# Patient Record
Sex: MALE | Race: WHITE | HISPANIC OR LATINO | Employment: UNEMPLOYED | URBAN - METROPOLITAN AREA
[De-identification: names, ages, dates, MRNs, and addresses within clinical notes are randomized per-mention and may not be internally consistent; named-entity substitution may affect disease eponyms.]

---

## 2023-03-31 DIAGNOSIS — M25.561 PAIN IN BOTH KNEES, UNSPECIFIED CHRONICITY: Primary | ICD-10-CM

## 2023-03-31 DIAGNOSIS — M25.562 PAIN IN BOTH KNEES, UNSPECIFIED CHRONICITY: Primary | ICD-10-CM

## 2023-04-03 ENCOUNTER — OFFICE VISIT (OUTPATIENT)
Dept: ORTHOPEDICS | Facility: CLINIC | Age: 38
End: 2023-04-03
Payer: MEDICAID

## 2023-04-03 VITALS
WEIGHT: 200.81 LBS | BODY MASS INDEX: 29.74 KG/M2 | SYSTOLIC BLOOD PRESSURE: 131 MMHG | HEART RATE: 96 BPM | DIASTOLIC BLOOD PRESSURE: 76 MMHG | HEIGHT: 69 IN

## 2023-04-03 DIAGNOSIS — M23.203 OLD BUCKET HANDLE TEAR OF MEDIAL MENISCUS OF RIGHT KNEE: ICD-10-CM

## 2023-04-03 PROCEDURE — 99999 PR PBB SHADOW E&M-EST. PATIENT-LVL III: ICD-10-PCS | Mod: PBBFAC,,,

## 2023-04-03 PROCEDURE — 99204 PR OFFICE/OUTPT VISIT, NEW, LEVL IV, 45-59 MIN: ICD-10-PCS | Mod: S$PBB,,,

## 2023-04-03 PROCEDURE — 3078F PR MOST RECENT DIASTOLIC BLOOD PRESSURE < 80 MM HG: ICD-10-PCS | Mod: CPTII,,,

## 2023-04-03 PROCEDURE — 3075F PR MOST RECENT SYSTOLIC BLOOD PRESS GE 130-139MM HG: ICD-10-PCS | Mod: CPTII,,,

## 2023-04-03 PROCEDURE — 3008F PR BODY MASS INDEX (BMI) DOCUMENTED: ICD-10-PCS | Mod: CPTII,,,

## 2023-04-03 PROCEDURE — 3078F DIAST BP <80 MM HG: CPT | Mod: CPTII,,,

## 2023-04-03 PROCEDURE — 99999 PR PBB SHADOW E&M-EST. PATIENT-LVL III: CPT | Mod: PBBFAC,,,

## 2023-04-03 PROCEDURE — 3008F BODY MASS INDEX DOCD: CPT | Mod: CPTII,,,

## 2023-04-03 PROCEDURE — 1159F PR MEDICATION LIST DOCUMENTED IN MEDICAL RECORD: ICD-10-PCS | Mod: CPTII,,,

## 2023-04-03 PROCEDURE — 3075F SYST BP GE 130 - 139MM HG: CPT | Mod: CPTII,,,

## 2023-04-03 PROCEDURE — 1159F MED LIST DOCD IN RCRD: CPT | Mod: CPTII,,,

## 2023-04-03 PROCEDURE — 99204 OFFICE O/P NEW MOD 45 MIN: CPT | Mod: S$PBB,,,

## 2023-04-03 PROCEDURE — 99213 OFFICE O/P EST LOW 20 MIN: CPT | Mod: PBBFAC,PN

## 2023-04-03 NOTE — LETTER
April 3, 2023      [facility]  [address], [facility] [address]  [facility] [address]  Phone: [phone]  Fax: [phone]       Patient: Lalo Tran   YOB: 1985  Date of Visit: 04/03/2023    To Whom It May Concern:    Ysabel Tran  was at [facility] on 04/03/2023. He may return to work on 04/03/2023 With/no restrictions. If you have any questions or concerns, or if I can be of further assistance, please do not hesitate to contact me.    Sincerely,    Mague Fischer MA

## 2023-04-03 NOTE — PROGRESS NOTES
Patient ID: Lalo Tran is a 38 y.o. male    Pain of the Right Knee      History of Present Illness:    Lalo Tran presents to clinic for right knee pain.  Patient reports about 7 years ago his playing softball and tried sliding and his knee popped.  He was seen in an outside orthopedic group and reports he was supposed to have ACL reconstruction surgery but this never happened.  He is now interested in surgery due to instability and mechanical symptoms.. The pain started 7 years ago and is becoming progressively worse.  Pain is located over (points to) medial right knee. He reports that the pain is a 6 /10 sharp and radiating pain toda. The pain is affecting ADLs and limiting desired level of activity. Denies numbness, tingling, radiation and inability to bear weight.    Trial of icing and ibuprofen with some improvement.     Mechanical symptoms: +   Instability: +    Occupation:     Ambulating: unassisted  Diabetic: no  Smoking: no  Hx of DVT/PE: no     PAST MEDICAL HISTORY:   Past Medical History:   Diagnosis Date    Herniated lumbar intervertebral disc     History of tear of ACL (anterior cruciate ligament)      PAST SURGICAL HISTORY:   Past Surgical History:   Procedure Laterality Date    APPENDECTOMY      HAND SURGERY       FAMILY HISTORY: No family history on file.  SOCIAL HISTORY:   Social History     Occupational History    Not on file   Tobacco Use    Smoking status: Former     Packs/day: 0.50     Types: Cigarettes    Smokeless tobacco: Current   Substance and Sexual Activity    Alcohol use: Yes     Comment: social    Drug use: No    Sexual activity: Not on file        MEDICATIONS: No current outpatient medications on file.  ALLERGIES: Review of patient's allergies indicates:  No Known Allergies      Physical Exam     Vitals:    04/03/23 0839   BP: 131/76   Pulse: 96     Alert and oriented to person, place and time. No acute distress. Well-groomed, not ill appearing. Pupils round and  reactive, normal respiratory effort, no audible wheezing.     OBSERVATION / INSPECTION   Gait:   Nonantalgic   Alignment:  Neutral   Scars:   None   Muscle atrophy: None  Effusion:  None   Warmth:  None   Discoloration:   None     TENDERNESS                 Patella     Negative    Peripatellar medial    +   Peripatellar lateral   Negative   Patellar tendon   Negative   Quad tendon     Negative    Prepatellar Bursa   Negative     Tibial tubercle    Negative    Pes anserine/HS   Negative      ITB     Negative      LCL     Negative  MFC     Negative  LFC     Negative  MCL      +  Medial Joint Line    +   Lateral Joint Line   Negative  Quadriceps    Negative  Hamstring     Negative            Range of  Motion:        Left knee:   0-140° *  Right knee:    0-140°      Patellofemoral examination:     Patella position    Centered  Crepitus (PF):    Absent   Lateral tilt:    Normal   J-sign:     None   Patellofemoral grind:   No pain       MENISCAL SIGNS:     Pain on terminal extension:  +  Pain on terminal flexion:  Negative  Reeces maneuver:  +    LIGAMENT EXAMINATION:  ACL / Lachman:  Abnl 2+   PCL-Post.  drawer: normal 0 to 2mm  MCL- Valgus:  normal 0 to 2mm  LCL- Varus:    normal 0 to 2mm    Dial Test: difference c/w other side  At 30° flexion: normal (< 5°)   At 90° flexion: normal (< 5°)       STRENGTH: (* = with pain)   Quadricep   5/5  Hamstrin/5    EXTREMITY NEURO-VASCULAR EXAMINATION:   Sensation:  Grossly intact to light touch all dermatomal regions.   Motor Function:  Fully intact motor function at hip, knee, foot and ankle    DTRs;  quadriceps and  achilles 2+.  No clonus and downgoing Babinski.    Vascular status:  DP and PT pulses 2+, brisk capillary refill, symmetric.     Imaging:     Bilateral knee X-rays ordered/reviewed by me showing no evidence of fracture or dislocation. There is no obvious malalignment. No evidence of masses, lesions or foreign bodies. Small calcification noted posterior  left knee      Assessment & Plan    Old bucket handle tear of medial meniscus of right knee  -     Cancel: MRI Knee Without Contrast Left; Future; Expected date: 04/03/2023  -     MRI Knee Without Contrast Right; Future; Expected date: 04/03/2023         I made the decision to obtain old records of the patient including previous notes and imaging. New imaging was ordered today of the extremity or extremities evaluated. I independently reviewed and interpreted the radiographs and/or MRIs/CT scan today as well as prior imaging.    We discussed at length different treatment options including conservative vs surgical management. These include anti-inflammatories, acetaminophen, rest, ice, heat, formal physical therapy including strengthening and stretching exercises, home exercise programs, injections, dry needling, and finally surgical intervention.      We will proceed today with MRI right knee to r/o meniscus/ACL tear. Patient endorsing instability and mechanical symptoms following injury. Hinge knee brace given, wear when active.     MRI right knee  Cont ibuprofen/icing prn  Hinge knee brace given  Ice compress to the affected area 2-3x a day for 15-20 minutes as needed for pain management    Follow up: MRI results virtually   X-rays next visit: none    All questions were answered and patient is agreeable to the above plan.

## 2023-05-01 ENCOUNTER — OFFICE VISIT (OUTPATIENT)
Dept: ORTHOPEDICS | Facility: CLINIC | Age: 38
End: 2023-05-01
Payer: MEDICAID

## 2023-05-01 VITALS — HEIGHT: 69 IN | WEIGHT: 200 LBS | BODY MASS INDEX: 29.62 KG/M2

## 2023-05-01 DIAGNOSIS — Z01.818 PRE-OP TESTING: Primary | ICD-10-CM

## 2023-05-01 DIAGNOSIS — M23.203 OLD BUCKET HANDLE TEAR OF MEDIAL MENISCUS OF RIGHT KNEE: ICD-10-CM

## 2023-05-01 DIAGNOSIS — S83.511D RUPTURE OF ANTERIOR CRUCIATE LIGAMENT OF RIGHT KNEE, SUBSEQUENT ENCOUNTER: ICD-10-CM

## 2023-05-01 PROCEDURE — 99999 PR PBB SHADOW E&M-EST. PATIENT-LVL III: CPT | Mod: PBBFAC,,, | Performed by: ORTHOPAEDIC SURGERY

## 2023-05-01 PROCEDURE — 99214 OFFICE O/P EST MOD 30 MIN: CPT | Mod: S$PBB,,, | Performed by: ORTHOPAEDIC SURGERY

## 2023-05-01 PROCEDURE — 99213 OFFICE O/P EST LOW 20 MIN: CPT | Mod: PBBFAC,PN | Performed by: ORTHOPAEDIC SURGERY

## 2023-05-01 PROCEDURE — 3008F BODY MASS INDEX DOCD: CPT | Mod: CPTII,,, | Performed by: ORTHOPAEDIC SURGERY

## 2023-05-01 PROCEDURE — 3008F PR BODY MASS INDEX (BMI) DOCUMENTED: ICD-10-PCS | Mod: CPTII,,, | Performed by: ORTHOPAEDIC SURGERY

## 2023-05-01 PROCEDURE — 99999 PR PBB SHADOW E&M-EST. PATIENT-LVL III: ICD-10-PCS | Mod: PBBFAC,,, | Performed by: ORTHOPAEDIC SURGERY

## 2023-05-01 PROCEDURE — 99214 PR OFFICE/OUTPT VISIT, EST, LEVL IV, 30-39 MIN: ICD-10-PCS | Mod: S$PBB,,, | Performed by: ORTHOPAEDIC SURGERY

## 2023-05-01 RX ORDER — CEFAZOLIN SODIUM 2 G/50ML
2 SOLUTION INTRAVENOUS
Status: CANCELLED | OUTPATIENT
Start: 2023-05-01

## 2023-10-31 ENCOUNTER — HOSPITAL ENCOUNTER (EMERGENCY)
Facility: HOSPITAL | Age: 38
Discharge: HOME | End: 2023-10-31
Attending: EMERGENCY MEDICINE
Payer: MEDICAID

## 2023-10-31 VITALS
WEIGHT: 201 LBS | HEART RATE: 118 BPM | OXYGEN SATURATION: 97 % | RESPIRATION RATE: 20 BRPM | TEMPERATURE: 101 F | SYSTOLIC BLOOD PRESSURE: 133 MMHG | DIASTOLIC BLOOD PRESSURE: 83 MMHG | BODY MASS INDEX: 29.68 KG/M2

## 2023-10-31 DIAGNOSIS — S62.609B PHALANX, HAND FRACTURE, OPEN, INITIAL ENCOUNTER: ICD-10-CM
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
## 2023-10-31 DIAGNOSIS — M25.522 LEFT ELBOW PAIN: ICD-10-CM

## 2023-10-31 DIAGNOSIS — M25.531 RIGHT WRIST PAIN: Primary | ICD-10-CM

## 2023-10-31 DIAGNOSIS — M79.632 LEFT FOREARM PAIN: ICD-10-CM

## 2023-10-31 DIAGNOSIS — M79.601 RIGHT ARM PAIN: ICD-10-CM

## 2023-10-31 PROBLEM — S62.616B OPEN DISPLACED FRACTURE OF PROXIMAL PHALANX OF RIGHT LITTLE FINGER: Status: ACTIVE | Noted: 2023-10-31

## 2023-10-31 LAB
INFLUENZA A, MOLECULAR: NEGATIVE
INFLUENZA B, MOLECULAR: NEGATIVE
SARS-COV-2 RDRP RESP QL NAA+PROBE: NEGATIVE
SPECIMEN SOURCE: NORMAL

## 2023-10-31 PROCEDURE — 29105 APPLICATION LONG ARM SPLINT: CPT | Mod: RT

## 2023-10-31 PROCEDURE — 63600175 PHARM REV CODE 636 W HCPCS

## 2023-10-31 PROCEDURE — U0002 COVID-19 LAB TEST NON-CDC: HCPCS

## 2023-10-31 PROCEDURE — 96375 TX/PRO/DX INJ NEW DRUG ADDON: CPT

## 2023-10-31 PROCEDURE — 12001 RPR S/N/AX/GEN/TRNK 2.5CM/<: CPT | Mod: 59

## 2023-10-31 PROCEDURE — 99284 EMERGENCY DEPT VISIT MOD MDM: CPT

## 2023-10-31 PROCEDURE — 87502 INFLUENZA DNA AMP PROBE: CPT | Performed by: EMERGENCY MEDICINE

## 2023-10-31 PROCEDURE — 96365 THER/PROPH/DIAG IV INF INIT: CPT

## 2023-10-31 PROCEDURE — 25000003 PHARM REV CODE 250

## 2023-10-31 PROCEDURE — 26725 TREAT FINGER FRACTURE EACH: CPT | Mod: F9

## 2023-10-31 RX ORDER — OXYCODONE HYDROCHLORIDE 5 MG/1
5 TABLET ORAL
Status: COMPLETED | OUTPATIENT
Start: 2023-10-31 | End: 2023-10-31

## 2023-10-31 RX ORDER — MORPHINE SULFATE 4 MG/ML
4 INJECTION, SOLUTION INTRAMUSCULAR; INTRAVENOUS
Status: DISCONTINUED | OUTPATIENT
Start: 2023-10-31 | End: 2023-10-31

## 2023-10-31 RX ORDER — SULFAMETHOXAZOLE AND TRIMETHOPRIM 800; 160 MG/1; MG/1
1 TABLET ORAL 2 TIMES DAILY
Qty: 20 TABLET | Refills: 0 | Status: SHIPPED | OUTPATIENT
Start: 2023-10-31 | End: 2023-11-10

## 2023-10-31 RX ORDER — MORPHINE SULFATE 4 MG/ML
4 INJECTION, SOLUTION INTRAMUSCULAR; INTRAVENOUS
Status: COMPLETED | OUTPATIENT
Start: 2023-10-31 | End: 2023-10-31

## 2023-10-31 RX ADMIN — CEFAZOLIN 2 G: 2 INJECTION, POWDER, FOR SOLUTION INTRAMUSCULAR; INTRAVENOUS at 05:10

## 2023-10-31 RX ADMIN — OXYCODONE HYDROCHLORIDE 5 MG: 5 TABLET ORAL at 09:10

## 2023-10-31 RX ADMIN — MORPHINE SULFATE 4 MG: 4 INJECTION INTRAVENOUS at 05:10

## 2023-10-31 NOTE — PROVIDER PROGRESS NOTES - EMERGENCY DEPT.
"Encounter Date: 10/31/2023    ED Physician Progress Notes        Physician Note:   Fever of 102 degrees; patient stated that family had recent illness. Patient denied cough but endorsed "feeling hot." Flu/COVID negative. Orthopedics with laceration repair and splint. Sent home with sling, bactrim, and close orthopedic surgery follow-up.       "
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
## 2023-11-01 ENCOUNTER — TELEPHONE (OUTPATIENT)
Dept: EMERGENCY MEDICINE | Facility: HOSPITAL | Age: 38
End: 2023-11-01
Payer: MEDICAID

## 2023-11-01 ENCOUNTER — TELEPHONE (OUTPATIENT)
Dept: ORTHOPEDICS | Facility: CLINIC | Age: 38
End: 2023-11-01
Payer: MEDICAID

## 2023-11-01 RX ORDER — HYDROCODONE BITARTRATE AND ACETAMINOPHEN 5; 325 MG/1; MG/1
1 TABLET ORAL EVERY 6 HOURS PRN
Qty: 6 TABLET | Refills: 0 | Status: SHIPPED | OUTPATIENT
Start: 2023-11-01 | End: 2024-02-21 | Stop reason: HOSPADM

## 2023-11-01 RX ORDER — IBUPROFEN 600 MG/1
600 TABLET ORAL EVERY 6 HOURS PRN
Qty: 15 TABLET | Refills: 0 | Status: SHIPPED | OUTPATIENT
Start: 2023-11-01 | End: 2023-11-07
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
## 2023-11-01 NOTE — SUBJECTIVE & OBJECTIVE
Past Medical History:   Diagnosis Date    Herniated lumbar intervertebral disc     History of tear of ACL (anterior cruciate ligament)        Past Surgical History:   Procedure Laterality Date    APPENDECTOMY      HAND SURGERY         Review of patient's allergies indicates:  No Known Allergies    No current facility-administered medications for this encounter.     Current Outpatient Medications   Medication Sig    sulfamethoxazole-trimethoprim 800-160mg (BACTRIM DS) 800-160 mg Tab Take 1 tablet by mouth 2 (two) times daily. for 10 days    traMADoL (ULTRAM) 50 mg tablet Take 1 tablet (50 mg total) by mouth every 6 (six) hours as needed for Pain. The medication you have been prescribed may cause drowsiness and impair your judgement.  Therefore, you should avoid driving, climbing, using machinery, etc., so as not to increase your risk of injury.  Do NOT drink any alcohol while on this medication(s). It is also addictive.     Family History    None       Tobacco Use    Smoking status: Former     Current packs/day: 0.50     Types: Cigarettes    Smokeless tobacco: Current   Substance and Sexual Activity    Alcohol use: Not Currently     Comment: social    Drug use: No    Sexual activity: Not on file     ROS    Constitutional: negative for fevers  Eyes: negative visual changes  ENT: negative for hearing loss  Respiratory: negative for dyspnea  Cardiovascular: negative for chest pain  Gastrointestinal: negative for abdominal pain  Genitourinary: negative for dysuria  Neurological: negative for headaches  Behavioral/Psych: negative for hallucinations  Endocrine: negative for temperature intolerance      Objective:     Vital Signs (Most Recent):  Temp: 98.6 °F (37 °C) (10/31/23 1545)  Pulse: 103 (10/31/23 1749)  Resp: 18 (10/31/23 2133)  BP: 135/76 (10/31/23 1749)  SpO2: 97 % (10/31/23 1749) Vital Signs (24h Range):  Temp:  [98.6 °F (37 °C)] 98.6 °F (37 °C)  Pulse:  [100-104] 103  Resp:  [18] 18  SpO2:  [96 %-100 %] 97  "%  BP: (135-160)/(76-91) 135/76     Weight: 91.2 kg (201 lb)     Body mass index is 29.68 kg/m².      Intake/Output Summary (Last 24 hours) at 10/31/2023 2135  Last data filed at 10/31/2023 1747  Gross per 24 hour   Intake 50 ml   Output --   Net 50 ml        Ortho/SPM Exam     Gen:  No acute distress, well-developed, well nourished.  CV:  Peripherally well-perfused. 2+ radial pulses, symmetric.  Respiratory:  Normal respiratory effort. No accessory muscle use.   Head/Neck:  Normocephalic.  Atraumatic. Sclera anicteric. TM. Neck supple.  Neuro: CN 2-12 grossly intact. No FND. Awake. Alert. Oriented to person, place, time, and situation.   Abdomen: Soft, NTND.      MSK:  Left Upper Extremity  Inspection  - Skin intact throughout, no open wounds  - No swelling  - No ecchymosis, erythema, or signs of cellulitis  Palpation  - NonTTP throughout, no palpable abnormality   Range of motion  - AROM and PROM of the shoulder, elbow, wrist, and hand intact  Stability  - No evidence of joint dislocation or abnormal laxity  Neurovascular  - AIN/PIN/Radial/Median/Ulnar Nerves assessed in isolation without deficit  - Able to give thumbs up, make "OK" sign, cross IF/LF, abduct/adduct fingers, make fist  - SILT throughout  - Compartments soft  - Radial artery palpated  - Capillary Refill <3s  - Muscle tone normal    Right Upper Extremity  Inspection  - 3cm laceration present over the webspace between the 4th and 5th digits, significant swelling and ecchymosis of the little finger  Palpation  - TTP over the wrist and little finger  - NTTP over all other fingers, elbow, humerus, and shoulder   Range of motion  - AROM and PROM of the shoulder, elbow, wrist intact  - AROM and PROM of little finger limited due to pain and deformity  Stability  - No evidence of joint dislocation or abnormal laxity   Neurovascular  - AIN/PIN/Radial/Median/Ulnar Nerves assessed in isolation without deficit  - Able to give thumbs up, make "OK" sign, cross " IF/LF, abduct/adduct fingers, make fist  - SILT throughout  - Compartments soft  - Radial artery palpated  - Capillary Refill <3s  - Muscle tone normal      Left Lower Extremity  Inspection  - Skin intact throughout, no open wounds  - No swelling  - No ecchymosis, erythema, or signs of cellulitis  Palpation  - NonTTP throughout, no palpable abnormality   Range of motion  - AROM and PROM of the hip, knee, ankle, and foot intact  Stability  - No evidence of joint dislocation or abnormal laxity  Neurovascular  - TA/EHL/Gastroc/FHL assessed in isolation without deficit  - SILT throughout  - Compartments soft  - DP palpated   - Capillary Refill <3s  - Negative Log roll  - Negative Stinchfield  - Muscle tone normal    Right Lower Extremity  Inspection  - Skin intact throughout, no open wounds  - No swelling  - No ecchymosis, erythema, or signs of cellulitis  Palpation  - NonTTP throughout, no palpable abnormality   Range of motion  - AROM and PROM of the hip, knee, ankle, and foot intact  Stability  - No evidence of joint dislocation or abnormal laxity  Neurovascular  - TA/EHL/Gastroc/FHL assessed in isolation without deficit  - SILT throughout  - Compartments soft  - DP palpated   - Capillary Refill <3s  - Negative Log roll  - Negative Stinchfield  - Muscle tone normal    Spine/pelvis/axial body:  No tenderness to palpation of cervical, thoracic, or lumbar spine  No pain with compression of pelvis  No chest wall or abdominal tenderness  No decubitus ulcers  Muscle tone normal      Significant Labs: All pertinent labs within the past 24 hours have been reviewed.    Significant Imaging: X-Ray: I have reviewed all pertinent results/findings and my personal findings are:  XR of the right wrist shows a nondisplaced distal radius fracture and XR of the hand shows a displaced proximal phalanx fracture.   I have reviewed and interpreted all pertinent imaging results/findings.
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
## 2023-11-01 NOTE — ASSESSMENT & PLAN NOTE
Lalo Tran is a 38 y.o. male with no significant pmhx presenting for a DR fracture and open proximal phalanx fracture of the little finger. Patient received TDAP and 2g of ancef was given at 5:15pm after orthopedics was consulted at 4:58pm. He is neurovascularly intact, and the finger had capillary refill <2s as well as sensation. He was placed in a sugar tong splint and ulnar gutter splint after laceration repair. Please see procedure note below for more details. Patient also received TDAP at this time.     - NWB to RUE  - pain control per ED  - discharged with 10 day course of DS bactrim BID  - Hand clinic messaged for follow up, they will reach out to patient    Procedure Note: Laceration Repair  After time out was performed and patient ID, side, and site were verified, the right little finger was sterilly prepped in the standard fashion.  10 mL's of 1% lidocaine were injected around the site with a 25-gauge needle. After adequate analgesia was obtained, the wound was examined. Examination showed 2.5cm laceration in the web space between the 4th and 5th digit. The laceration was then thoroughly irrigated with 2L of normal saline and betadine. At this point, the wound was primarily closed using 3 - 0 Ethilon in a running fashion. The patient tolerated the procedure well with no complications.  Blood loss was minimal.    Procedure Note: Right little finger reduction  Patient was explained risks, benefits, and alternatives to treatment and verbalized consent to proceed. Time out was performed and patient name, , site, and procedure were confirmed. 10 cc of 1% lidocaine in 25 gauge needle was used for hematoma block. Fracture was reduced under fluoroscopy. Sugar tong and ulnar gutter splints were applied in typical fashion. Post-reduction films were performed and confirmed adequate reduction. Patient tolerated the procedure well.
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
## 2023-11-01 NOTE — TELEPHONE ENCOUNTER
Returned the pt's call and advised him to see his  1st and then let them send him to an Orthopedic that deals with that. Pt v/u
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
## 2023-11-06 ENCOUNTER — OFFICE VISIT (OUTPATIENT)
Dept: ORTHOPEDICS | Facility: CLINIC | Age: 38
End: 2023-11-06
Payer: MEDICAID

## 2023-11-06 ENCOUNTER — HOSPITAL ENCOUNTER (OUTPATIENT)
Dept: RADIOLOGY | Facility: HOSPITAL | Age: 38
Discharge: HOME | End: 2023-11-06
Attending: PHYSICIAN ASSISTANT
Payer: MEDICAID

## 2023-11-06 VITALS — HEIGHT: 69 IN | WEIGHT: 201.06 LBS | BODY MASS INDEX: 29.78 KG/M2

## 2023-11-06 DIAGNOSIS — S62.616B OPEN DISPLACED FRACTURE OF PROXIMAL PHALANX OF RIGHT LITTLE FINGER, INITIAL ENCOUNTER: ICD-10-CM

## 2023-11-06 DIAGNOSIS — T14.8XXA FRACTURE: ICD-10-CM

## 2023-11-06 DIAGNOSIS — S52.501A CLOSED FRACTURE OF DISTAL END OF RIGHT RADIUS, UNSPECIFIED FRACTURE MORPHOLOGY, INITIAL ENCOUNTER: Primary | ICD-10-CM

## 2023-11-06 PROCEDURE — 1159F MED LIST DOCD IN RCRD: CPT | Mod: CPTII,,, | Performed by: PHYSICIAN ASSISTANT

## 2023-11-06 PROCEDURE — 73110 X-RAY EXAM OF WRIST: CPT | Mod: 26,RT,, | Performed by: RADIOLOGY

## 2023-11-06 PROCEDURE — 99999 PR PBB SHADOW E&M-EST. PATIENT-LVL III: CPT | Mod: PBBFAC,,, | Performed by: PHYSICIAN ASSISTANT

## 2023-11-06 PROCEDURE — 3008F BODY MASS INDEX DOCD: CPT | Mod: CPTII,,, | Performed by: PHYSICIAN ASSISTANT

## 2023-11-06 PROCEDURE — 99214 PR OFFICE/OUTPT VISIT, EST, LEVL IV, 30-39 MIN: ICD-10-PCS | Mod: S$PBB,,, | Performed by: PHYSICIAN ASSISTANT

## 2023-11-06 PROCEDURE — 99999 PR PBB SHADOW E&M-EST. PATIENT-LVL III: ICD-10-PCS | Mod: PBBFAC,,, | Performed by: PHYSICIAN ASSISTANT

## 2023-11-06 PROCEDURE — 73140 X-RAY EXAM OF FINGER(S): CPT | Mod: TC,RT

## 2023-11-06 PROCEDURE — 99213 OFFICE O/P EST LOW 20 MIN: CPT | Mod: PBBFAC | Performed by: PHYSICIAN ASSISTANT

## 2023-11-06 PROCEDURE — 73110 XR WRIST COMPLETE 3 VIEWS RIGHT: ICD-10-PCS | Mod: 26,RT,, | Performed by: RADIOLOGY

## 2023-11-06 PROCEDURE — 99214 OFFICE O/P EST MOD 30 MIN: CPT | Mod: S$PBB,,, | Performed by: PHYSICIAN ASSISTANT

## 2023-11-06 PROCEDURE — 1159F PR MEDICATION LIST DOCUMENTED IN MEDICAL RECORD: ICD-10-PCS | Mod: CPTII,,, | Performed by: PHYSICIAN ASSISTANT

## 2023-11-06 PROCEDURE — 3008F PR BODY MASS INDEX (BMI) DOCUMENTED: ICD-10-PCS | Mod: CPTII,,, | Performed by: PHYSICIAN ASSISTANT

## 2023-11-06 PROCEDURE — 73140 X-RAY EXAM OF FINGER(S): CPT | Mod: 26,RT,, | Performed by: RADIOLOGY

## 2023-11-06 PROCEDURE — 73140 XR FINGER 2 OR MORE VIEWS RIGHT: ICD-10-PCS | Mod: 26,RT,, | Performed by: RADIOLOGY

## 2023-11-06 PROCEDURE — 73110 X-RAY EXAM OF WRIST: CPT | Mod: TC,RT

## 2023-11-07 ENCOUNTER — OFFICE VISIT (OUTPATIENT)
Dept: ORTHOPEDICS | Facility: CLINIC | Age: 38
End: 2023-11-07
Payer: MEDICAID

## 2023-11-07 VITALS — HEIGHT: 69 IN | BODY MASS INDEX: 29.77 KG/M2 | WEIGHT: 201 LBS

## 2023-11-07 DIAGNOSIS — S52.501A CLOSED FRACTURE OF DISTAL END OF RIGHT RADIUS, UNSPECIFIED FRACTURE MORPHOLOGY, INITIAL ENCOUNTER: ICD-10-CM

## 2023-11-07 DIAGNOSIS — S62.616B OPEN DISPLACED FRACTURE OF PROXIMAL PHALANX OF RIGHT LITTLE FINGER, INITIAL ENCOUNTER: Primary | ICD-10-CM

## 2023-11-07 DIAGNOSIS — M25.531 RIGHT WRIST PAIN: ICD-10-CM

## 2023-11-07 PROCEDURE — 99999 PR PBB SHADOW E&M-EST. PATIENT-LVL II: ICD-10-PCS | Mod: PBBFAC,,, | Performed by: ORTHOPAEDIC SURGERY

## 2023-11-07 PROCEDURE — 99214 PR OFFICE/OUTPT VISIT, EST, LEVL IV, 30-39 MIN: ICD-10-PCS | Mod: S$PBB,,, | Performed by: ORTHOPAEDIC SURGERY

## 2023-11-07 PROCEDURE — 3008F PR BODY MASS INDEX (BMI) DOCUMENTED: ICD-10-PCS | Mod: CPTII,,, | Performed by: ORTHOPAEDIC SURGERY

## 2023-11-07 PROCEDURE — 99999 PR PBB SHADOW E&M-EST. PATIENT-LVL II: CPT | Mod: PBBFAC,,, | Performed by: ORTHOPAEDIC SURGERY

## 2023-11-07 PROCEDURE — 3008F BODY MASS INDEX DOCD: CPT | Mod: CPTII,,, | Performed by: ORTHOPAEDIC SURGERY

## 2023-11-07 PROCEDURE — 99214 OFFICE O/P EST MOD 30 MIN: CPT | Mod: S$PBB,,, | Performed by: ORTHOPAEDIC SURGERY

## 2023-11-07 PROCEDURE — 99212 OFFICE O/P EST SF 10 MIN: CPT | Mod: PBBFAC | Performed by: ORTHOPAEDIC SURGERY

## 2023-11-07 PROCEDURE — 1159F MED LIST DOCD IN RCRD: CPT | Mod: CPTII,,, | Performed by: ORTHOPAEDIC SURGERY

## 2023-11-07 PROCEDURE — 1159F PR MEDICATION LIST DOCUMENTED IN MEDICAL RECORD: ICD-10-PCS | Mod: CPTII,,, | Performed by: ORTHOPAEDIC SURGERY

## 2023-11-07 RX ORDER — ACETAMINOPHEN 500 MG
1000 TABLET ORAL EVERY 6 HOURS PRN
Qty: 60 TABLET | Refills: 0 | Status: ON HOLD | OUTPATIENT
Start: 2023-11-07 | End: 2024-02-26 | Stop reason: HOSPADM

## 2023-11-07 RX ORDER — OXYCODONE HYDROCHLORIDE 5 MG/1
5 TABLET ORAL EVERY 4 HOURS PRN
Qty: 30 TABLET | Refills: 0 | Status: SHIPPED | OUTPATIENT
Start: 2023-11-07 | End: 2024-02-21 | Stop reason: HOSPADM

## 2023-11-07 RX ORDER — SULFAMETHOXAZOLE AND TRIMETHOPRIM 800; 160 MG/1; MG/1
1 TABLET ORAL 2 TIMES DAILY
Qty: 14 TABLET | Refills: 0 | Status: SHIPPED | OUTPATIENT
Start: 2023-11-07 | End: 2023-11-14

## 2023-11-07 RX ORDER — IBUPROFEN 800 MG/1
800 TABLET ORAL EVERY 8 HOURS PRN
Qty: 30 TABLET | Refills: 0 | Status: SHIPPED | OUTPATIENT
Start: 2023-11-07 | End: 2024-01-04 | Stop reason: SDUPTHER
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
## 2023-11-08 ENCOUNTER — HOSPITAL ENCOUNTER (OUTPATIENT)
Dept: RADIOLOGY | Facility: HOSPITAL | Age: 38
Discharge: HOME | End: 2023-11-08
Payer: MEDICAID

## 2023-11-08 DIAGNOSIS — Z98.890 POST-OPERATIVE STATE: Primary | ICD-10-CM

## 2023-11-08 DIAGNOSIS — M25.531 RIGHT WRIST PAIN: ICD-10-CM

## 2023-11-08 PROCEDURE — 73200 CT WRIST WITHOUT CONTRAST RIGHT: ICD-10-PCS | Mod: 26,RT,, | Performed by: RADIOLOGY

## 2023-11-08 PROCEDURE — 73200 CT UPPER EXTREMITY W/O DYE: CPT | Mod: 26,RT,, | Performed by: RADIOLOGY

## 2023-11-08 PROCEDURE — 73200 CT UPPER EXTREMITY W/O DYE: CPT | Mod: TC,RT

## 2023-11-08 RX ORDER — OXYCODONE AND ACETAMINOPHEN 5; 325 MG/1; MG/1
1 TABLET ORAL
Qty: 10 TABLET | Refills: 0 | Status: SHIPPED | OUTPATIENT
Start: 2023-11-08 | End: 2023-11-08

## 2023-11-08 RX ORDER — IBUPROFEN 600 MG/1
600 TABLET ORAL 3 TIMES DAILY PRN
Qty: 45 TABLET | Refills: 0 | Status: SHIPPED | OUTPATIENT
Start: 2023-11-08 | End: 2023-11-08

## 2023-11-08 RX ORDER — ACETAMINOPHEN 500 MG
1000 TABLET ORAL 2 TIMES DAILY PRN
Qty: 50 TABLET | Refills: 0 | Status: SHIPPED | OUTPATIENT
Start: 2023-11-08 | End: 2023-11-08

## 2023-11-09 ENCOUNTER — ANESTHESIA EVENT (OUTPATIENT)
Dept: SURGERY | Facility: HOSPITAL | Age: 38
End: 2023-11-09
Payer: MEDICAID

## 2023-11-09 ENCOUNTER — TELEPHONE (OUTPATIENT)
Dept: ORTHOPEDICS | Facility: CLINIC | Age: 38
End: 2023-11-09
Payer: MEDICAID

## 2023-11-09 PROBLEM — S52.501A CLOSED FRACTURE OF RIGHT DISTAL RADIUS: Status: ACTIVE | Noted: 2023-11-09

## 2023-11-09 NOTE — TELEPHONE ENCOUNTER
MEGANM to Inform pt of 11:00 a.m. arrival time for  11/10/23 surgery at the [facility]. Reminded pt of NPO status. Requested a call back to the [facility] at [phone] with any questions and to confirm.   My O message sent

## 2023-11-10 ENCOUNTER — ANESTHESIA (OUTPATIENT)
Dept: SURGERY | Facility: HOSPITAL | Age: 38
End: 2023-11-10
Payer: MEDICAID

## 2023-11-10 ENCOUNTER — HOSPITAL ENCOUNTER (OUTPATIENT)
Facility: HOSPITAL | Age: 38
Discharge: HOME | End: 2023-11-10
Attending: ORTHOPAEDIC SURGERY | Admitting: ORTHOPAEDIC SURGERY
Payer: MEDICAID

## 2023-11-10 DIAGNOSIS — S62.616B OPEN DISPLACED FRACTURE OF PROXIMAL PHALANX OF RIGHT LITTLE FINGER, INITIAL ENCOUNTER: Primary | ICD-10-CM

## 2023-11-10 DIAGNOSIS — S52.571A OTHER CLOSED INTRA-ARTICULAR FRACTURE OF DISTAL END OF RIGHT RADIUS, INITIAL ENCOUNTER: ICD-10-CM

## 2023-11-10 DIAGNOSIS — S62.616B: ICD-10-CM

## 2023-11-10 PROCEDURE — 25000003 PHARM REV CODE 250: Performed by: NURSE ANESTHETIST, CERTIFIED REGISTERED

## 2023-11-10 PROCEDURE — 94761 N-INVAS EAR/PLS OXIMETRY MLT: CPT

## 2023-11-10 PROCEDURE — D9220A PRA ANESTHESIA: Mod: CRNA,,, | Performed by: NURSE ANESTHETIST, CERTIFIED REGISTERED

## 2023-11-10 PROCEDURE — C1713 ANCHOR/SCREW BN/BN,TIS/BN: HCPCS | Performed by: ORTHOPAEDIC SURGERY

## 2023-11-10 PROCEDURE — 37000008 HC ANESTHESIA 1ST 15 MINUTES: Performed by: ORTHOPAEDIC SURGERY

## 2023-11-10 PROCEDURE — 25607 PR OPEN RX DISTAL RADIUS FX, EXTRA-ARTICULAR: ICD-10-PCS | Mod: RT,,, | Performed by: ORTHOPAEDIC SURGERY

## 2023-11-10 PROCEDURE — D9220A PRA ANESTHESIA: ICD-10-PCS | Mod: ANES,,, | Performed by: ANESTHESIOLOGY

## 2023-11-10 PROCEDURE — 71000015 HC POSTOP RECOV 1ST HR: Performed by: ORTHOPAEDIC SURGERY

## 2023-11-10 PROCEDURE — 64415 NJX AA&/STRD BRCH PLXS IMG: CPT | Performed by: ANESTHESIOLOGY

## 2023-11-10 PROCEDURE — 27200750 HC INSULATED NEEDLE/ STIMUPLEX: Performed by: ANESTHESIOLOGY

## 2023-11-10 PROCEDURE — 26735 PR OPEN TX PHALANGEAL SHAFT FRACTURE PROX/MIDDLE EA: ICD-10-PCS | Mod: 51,F9,, | Performed by: ORTHOPAEDIC SURGERY

## 2023-11-10 PROCEDURE — 27201423 OPTIME MED/SURG SUP & DEVICES STERILE SUPPLY: Performed by: ORTHOPAEDIC SURGERY

## 2023-11-10 PROCEDURE — D9220A PRA ANESTHESIA: ICD-10-PCS | Mod: CRNA,,, | Performed by: NURSE ANESTHETIST, CERTIFIED REGISTERED

## 2023-11-10 PROCEDURE — 63600175 PHARM REV CODE 636 W HCPCS: Performed by: PHYSICIAN ASSISTANT

## 2023-11-10 PROCEDURE — 71000033 HC RECOVERY, INTIAL HOUR: Performed by: ORTHOPAEDIC SURGERY

## 2023-11-10 PROCEDURE — 63600175 PHARM REV CODE 636 W HCPCS

## 2023-11-10 PROCEDURE — 26735 TREAT FINGER FRACTURE EACH: CPT | Mod: 51,F9,, | Performed by: ORTHOPAEDIC SURGERY

## 2023-11-10 PROCEDURE — 37000009 HC ANESTHESIA EA ADD 15 MINS: Performed by: ORTHOPAEDIC SURGERY

## 2023-11-10 PROCEDURE — C1769 GUIDE WIRE: HCPCS | Performed by: ORTHOPAEDIC SURGERY

## 2023-11-10 PROCEDURE — 64415 PR NERVE BLOCK INJ, ANES/STEROID, BRACHIAL PLEXUS, INCL IMAG GUIDANCE: ICD-10-PCS | Mod: 59,RT,, | Performed by: ANESTHESIOLOGY

## 2023-11-10 PROCEDURE — 36000710: Performed by: ORTHOPAEDIC SURGERY

## 2023-11-10 PROCEDURE — 25000003 PHARM REV CODE 250

## 2023-11-10 PROCEDURE — 63600175 PHARM REV CODE 636 W HCPCS: Performed by: NURSE ANESTHETIST, CERTIFIED REGISTERED

## 2023-11-10 PROCEDURE — D9220A PRA ANESTHESIA: Mod: ANES,,, | Performed by: ANESTHESIOLOGY

## 2023-11-10 PROCEDURE — 25607 OPTX DST RD XARTC FX/EPI SEP: CPT | Mod: RT,,, | Performed by: ORTHOPAEDIC SURGERY

## 2023-11-10 PROCEDURE — 36000711: Performed by: ORTHOPAEDIC SURGERY

## 2023-11-10 PROCEDURE — 64415 NJX AA&/STRD BRCH PLXS IMG: CPT | Mod: 59,RT,, | Performed by: ANESTHESIOLOGY

## 2023-11-10 PROCEDURE — 99900035 HC TECH TIME PER 15 MIN (STAT)

## 2023-11-10 PROCEDURE — 25000003 PHARM REV CODE 250: Performed by: PHYSICIAN ASSISTANT

## 2023-11-10 PROCEDURE — 25000003 PHARM REV CODE 250: Performed by: ORTHOPAEDIC SURGERY

## 2023-11-10 PROCEDURE — 63600175 PHARM REV CODE 636 W HCPCS: Performed by: ANESTHESIOLOGY

## 2023-11-10 DEVICE — SCREW BONE NL HEXALOBE 3.5 X 1: Type: IMPLANTABLE DEVICE | Site: FINGER | Status: FUNCTIONAL

## 2023-11-10 DEVICE — SCREW BONE 2.3 X 18MM: Type: IMPLANTABLE DEVICE | Site: FINGER | Status: FUNCTIONAL

## 2023-11-10 DEVICE — SCREW BONE LOK CONG 2.3X22MM: Type: IMPLANTABLE DEVICE | Site: FINGER | Status: FUNCTIONAL

## 2023-11-10 DEVICE — PLATE BONE DST VOLAR RAD STND: Type: IMPLANTABLE DEVICE | Site: FINGER | Status: FUNCTIONAL

## 2023-11-10 RX ORDER — DEXMEDETOMIDINE HYDROCHLORIDE 100 UG/ML
INJECTION, SOLUTION INTRAVENOUS
Status: DISCONTINUED | OUTPATIENT
Start: 2023-11-10 | End: 2023-11-10

## 2023-11-10 RX ORDER — SODIUM CHLORIDE 9 MG/ML
INJECTION, SOLUTION INTRAVENOUS CONTINUOUS PRN
Status: DISCONTINUED | OUTPATIENT
Start: 2023-11-10 | End: 2023-11-10

## 2023-11-10 RX ORDER — PROPOFOL 10 MG/ML
INJECTION, EMULSION INTRAVENOUS CONTINUOUS PRN
Status: DISCONTINUED | OUTPATIENT
Start: 2023-11-10 | End: 2023-11-10

## 2023-11-10 RX ORDER — FENTANYL CITRATE 50 UG/ML
100 INJECTION, SOLUTION INTRAMUSCULAR; INTRAVENOUS
Status: DISCONTINUED | OUTPATIENT
Start: 2023-11-10 | End: 2023-11-10 | Stop reason: HOSPADM

## 2023-11-10 RX ORDER — MUPIROCIN 20 MG/G
OINTMENT TOPICAL
Status: DISCONTINUED | OUTPATIENT
Start: 2023-11-10 | End: 2023-11-10 | Stop reason: HOSPADM

## 2023-11-10 RX ORDER — SODIUM CHLORIDE 0.9 % (FLUSH) 0.9 %
3 SYRINGE (ML) INJECTION
Status: DISCONTINUED | OUTPATIENT
Start: 2023-11-10 | End: 2023-11-10 | Stop reason: HOSPADM

## 2023-11-10 RX ORDER — ONDANSETRON 2 MG/ML
INJECTION INTRAMUSCULAR; INTRAVENOUS
Status: DISCONTINUED | OUTPATIENT
Start: 2023-11-10 | End: 2023-11-10

## 2023-11-10 RX ORDER — HALOPERIDOL 5 MG/ML
0.5 INJECTION INTRAMUSCULAR EVERY 10 MIN PRN
Status: DISCONTINUED | OUTPATIENT
Start: 2023-11-10 | End: 2023-11-10 | Stop reason: HOSPADM

## 2023-11-10 RX ORDER — LIDOCAINE HYDROCHLORIDE 20 MG/ML
INJECTION INTRAVENOUS
Status: DISCONTINUED | OUTPATIENT
Start: 2023-11-10 | End: 2023-11-10

## 2023-11-10 RX ORDER — ACETAMINOPHEN 500 MG
1000 TABLET ORAL
Status: COMPLETED | OUTPATIENT
Start: 2023-11-10 | End: 2023-11-10

## 2023-11-10 RX ORDER — OXYCODONE HYDROCHLORIDE 5 MG/1
5 TABLET ORAL
Status: DISCONTINUED | OUTPATIENT
Start: 2023-11-10 | End: 2023-11-10 | Stop reason: HOSPADM

## 2023-11-10 RX ORDER — MIDAZOLAM HYDROCHLORIDE 1 MG/ML
1 INJECTION INTRAMUSCULAR; INTRAVENOUS
Status: DISCONTINUED | OUTPATIENT
Start: 2023-11-10 | End: 2023-11-10 | Stop reason: HOSPADM

## 2023-11-10 RX ORDER — BACITRACIN ZINC 500 UNIT/G
OINTMENT (GRAM) TOPICAL
Status: DISCONTINUED | OUTPATIENT
Start: 2023-11-10 | End: 2023-11-10 | Stop reason: HOSPADM

## 2023-11-10 RX ORDER — FENTANYL CITRATE 50 UG/ML
25 INJECTION, SOLUTION INTRAMUSCULAR; INTRAVENOUS EVERY 5 MIN PRN
Status: DISCONTINUED | OUTPATIENT
Start: 2023-11-10 | End: 2023-11-10 | Stop reason: HOSPADM

## 2023-11-10 RX ORDER — FAMOTIDINE 10 MG/ML
INJECTION INTRAVENOUS
Status: DISCONTINUED | OUTPATIENT
Start: 2023-11-10 | End: 2023-11-10

## 2023-11-10 RX ORDER — DEXAMETHASONE SODIUM PHOSPHATE 4 MG/ML
INJECTION, SOLUTION INTRA-ARTICULAR; INTRALESIONAL; INTRAMUSCULAR; INTRAVENOUS; SOFT TISSUE
Status: DISCONTINUED | OUTPATIENT
Start: 2023-11-10 | End: 2023-11-10

## 2023-11-10 RX ORDER — ROPIVACAINE HYDROCHLORIDE 5 MG/ML
INJECTION, SOLUTION EPIDURAL; INFILTRATION; PERINEURAL
Status: COMPLETED | OUTPATIENT
Start: 2023-11-10 | End: 2023-11-10

## 2023-11-10 RX ORDER — SODIUM CHLORIDE 9 MG/ML
INJECTION, SOLUTION INTRAVENOUS CONTINUOUS
Status: DISCONTINUED | OUTPATIENT
Start: 2023-11-10 | End: 2023-11-10 | Stop reason: HOSPADM

## 2023-11-10 RX ADMIN — PROPOFOL 150 MCG/KG/MIN: 10 INJECTION, EMULSION INTRAVENOUS at 12:11

## 2023-11-10 RX ADMIN — DEXAMETHASONE SODIUM PHOSPHATE 4 MG: 4 INJECTION, SOLUTION INTRAMUSCULAR; INTRAVENOUS at 12:11

## 2023-11-10 RX ADMIN — SODIUM CHLORIDE 1000 ML: 0.9 INJECTION, SOLUTION INTRAVENOUS at 11:11

## 2023-11-10 RX ADMIN — FAMOTIDINE 20 MG: 10 INJECTION, SOLUTION INTRAVENOUS at 12:11

## 2023-11-10 RX ADMIN — FENTANYL CITRATE 100 MCG: 50 INJECTION INTRAMUSCULAR; INTRAVENOUS at 11:11

## 2023-11-10 RX ADMIN — LIDOCAINE HYDROCHLORIDE 60 MG: 20 INJECTION INTRAVENOUS at 12:11

## 2023-11-10 RX ADMIN — DEXMEDETOMIDINE 10 MCG: 100 INJECTION, SOLUTION, CONCENTRATE INTRAVENOUS at 12:11

## 2023-11-10 RX ADMIN — ROPIVACAINE HYDROCHLORIDE 30 ML: 5 INJECTION EPIDURAL; INFILTRATION; PERINEURAL at 11:11

## 2023-11-10 RX ADMIN — CEFAZOLIN 2 G: 2 INJECTION, POWDER, FOR SOLUTION INTRAMUSCULAR; INTRAVENOUS at 12:11

## 2023-11-10 RX ADMIN — SODIUM CHLORIDE: 0.9 INJECTION, SOLUTION INTRAVENOUS at 11:11

## 2023-11-10 RX ADMIN — ONDANSETRON 4 MG: 2 INJECTION INTRAMUSCULAR; INTRAVENOUS at 12:11

## 2023-11-10 RX ADMIN — MIDAZOLAM 2 MG: 1 INJECTION INTRAMUSCULAR; INTRAVENOUS at 11:11

## 2023-11-10 RX ADMIN — ACETAMINOPHEN 1000 MG: 500 TABLET ORAL at 11:11

## 2023-11-10 RX ADMIN — MUPIROCIN: 20 OINTMENT TOPICAL at 11:11

## 2023-11-10 NOTE — ANESTHESIA PREPROCEDURE EVALUATION
11/10/2023  Lalo Tran is a 38 y.o., male.    Pre-operative evaluation for Procedure(s) (LRB):  ORIF, FINGER, Small finger (Right)  CLOSED REDUCTION, WRIST, WITH PERCUTANEOUS PINNING, Radial styloid (Right)    Lalo rTan is a 38 y.o. male     Patient Active Problem List   Diagnosis    Open displaced fracture of proximal phalanx of right little finger    Closed fracture of right distal radius       Review of patient's allergies indicates:  No Known Allergies    No current facility-administered medications on file prior to encounter.     Current Outpatient Medications on File Prior to Encounter   Medication Sig Dispense Refill    oxyCODONE (ROXICODONE) 5 MG immediate release tablet Take 1 tablet (5 mg total) by mouth every 4 (four) hours as needed for Pain. 30 tablet 0    sulfamethoxazole-trimethoprim 800-160mg (BACTRIM DS) 800-160 mg Tab Take 1 tablet by mouth 2 (two) times daily. for 10 days 20 tablet 0    acetaminophen (TYLENOL) 500 MG tablet Take 2 tablets (1,000 mg total) by mouth every 6 (six) hours as needed for Pain. 60 tablet 0    HYDROcodone-acetaminophen (NORCO) 5-325 mg per tablet Take 1 tablet by mouth every 6 (six) hours as needed for Pain (severe pain). 6 tablet 0    ibuprofen (ADVIL,MOTRIN) 800 MG tablet Take 1 tablet (800 mg total) by mouth every 8 (eight) hours as needed for Pain. 30 tablet 0    sulfamethoxazole-trimethoprim 800-160mg (BACTRIM DS) 800-160 mg Tab Take 1 tablet by mouth 2 (two) times daily. for 7 days 14 tablet 0    traMADoL (ULTRAM) 50 mg tablet Take 1 tablet (50 mg total) by mouth every 6 (six) hours as needed for Pain. The medication you have been prescribed may cause drowsiness and impair your judgement.  Therefore, you should avoid driving, climbing, using machinery, etc., so as not to increase your risk of injury.  Do NOT drink any alcohol while on  "this medication(s). It is also addictive. 7 tablet 0       Past Surgical History:   Procedure Laterality Date    APPENDECTOMY      HAND SURGERY           Pre-op Vitals [11/10/23 1056]   BP Pulse Resp Temp SpO2   (!) 151/94 71 (!) 21 36.8 °C (98.2 °F) 97 %      Height Weight BMI (Calculated)     5' 9" 201 lb 29.7            Pre-op Assessment    I have reviewed the Patient Summary Reports.     I have reviewed the Nursing Notes. I have reviewed the NPO Status.      Review of Systems  Anesthesia Hx:  No problems with previous Anesthesia    Cardiovascular:   Exercise tolerance: good    Hepatic/GI:   Denies GERD.        Physical Exam  General: Well nourished and Cooperative    Airway:  Mallampati: III   Mouth Opening: Small, but > 3cm  TM Distance: Normal  Tongue: Normal    Dental:  Intact    Chest/Lungs:  Clear to auscultation    Heart:  Rate: Normal  Rhythm: Regular Rhythm        Anesthesia Plan  Type of Anesthesia, risks & benefits discussed:    Anesthesia Type: Regional, Gen Natural Airway  Intra-op Monitoring Plan: Standard ASA Monitors  Post Op Pain Control Plan: multimodal analgesia, peripheral nerve block and IV/PO Opioids PRN  Induction:  IV  Informed Consent: Informed consent signed with the Patient and all parties understand the risks and agree with anesthesia plan.  All questions answered.   ASA Score: 1    Ready For Surgery From Anesthesia Perspective.     .      "

## 2023-11-10 NOTE — BRIEF OP NOTE
[facility] - Surgery ([facility])  Brief Operative Note    Surgery Date: 11/10/2023     Surgeon(s) and Role:     * Debbie Meza MD - Primary    Assisting Surgeon: None    Pre-op Diagnosis:  Open displaced fracture of proximal phalanx of right little finger, initial encounter [S62.616B]  Closed fracture of distal end of right radius, unspecified fracture morphology, initial encounter [S52.501A]  Right wrist pain [M25.531]    Post-op Diagnosis:  Post-Op Diagnosis Codes:     * Open displaced fracture of proximal phalanx of right little finger, initial encounter [S62.616B]     * Closed fracture of distal end of right radius, unspecified fracture morphology, initial encounter [S52.501A]     * Right wrist pain [M25.531]    Procedure(s) (LRB):  ORIF, FINGER, Small finger (Right)  ORIF, FRACTURE, RADIUS, DISTAL    Anesthesia: Regional    Operative Findings: see OP note    Estimated Blood Loss: < 10 mL         Specimens: None    Discharge Note    OUTCOME: Patient tolerated treatment/procedure well without complication and is now ready for discharge.    DISPOSITION: Home or Self Care    FINAL DIAGNOSIS:  Open displaced fracture of proximal phalanx of right little finger    FOLLOWUP: In clinic    DISCHARGE INSTRUCTIONS:    Discharge Procedure Orders   Diet general     Call MD for:  temperature >100.4     Call MD for:  persistent nausea and vomiting     Call MD for:  severe uncontrolled pain     Call MD for:  difficulty breathing, headache or visual disturbances     Call MD for:  redness, tenderness, or signs of infection (pain, swelling, redness, odor or green/yellow discharge around incision site)     Call MD for:  hives     Call MD for:  persistent dizziness or light-headedness     Call MD for:  extreme fatigue     Leave dressing on - Keep it clean, dry, and intact until clinic visit     Non weight bearing   Order Comments: Range of motion as tolerated to the fingers

## 2023-11-10 NOTE — ANESTHESIA PROCEDURE NOTES
Supraclavicular single shot    Patient location during procedure: pre-op   Block not for primary anesthetic.  Reason for block: at surgeon's request and post-op pain management   Post-op Pain Location: right hand pain   Start time: 11/10/2023 11:42 AM  Timeout: 11/10/2023 11:40 AM   End time: 11/10/2023 11:45 AM    Staffing  Authorizing Provider: Stephanie Burger MD  Performing Provider: Stephanie Burger MD    Staffing  Performed by: Stephanie Burger MD  Authorized by: Stephanie Burger MD    Preanesthetic Checklist  Completed: patient identified, IV checked, site marked, risks and benefits discussed, surgical consent, monitors and equipment checked, pre-op evaluation and timeout performed  Peripheral Block  Patient position: supine  Prep: ChloraPrep  Patient monitoring: heart rate, cardiac monitor, continuous pulse ox, continuous capnometry and frequent blood pressure checks  Block type: supraclavicular  Laterality: right  Injection technique: single shot  Needle  Needle type: Stimuplex   Needle gauge: 22 G  Needle length: 2 in  Needle localization: anatomical landmarks and ultrasound guidance   -ultrasound image captured on disc.  Assessment  Injection assessment: negative aspiration, negative parasthesia and local visualized surrounding nerve  Paresthesia pain: none  Heart rate change: no  Slow fractionated injection: yes    Medications:    Medications: ropivacaine (NAROPIN) injection 0.5% - Perineural   30 mL - 11/10/2023 11:45:00 AM    Additional Notes  VSS.  [facility] RN monitoring vitals throughout procedure.  Patient tolerated procedure well.

## 2023-11-10 NOTE — TRANSFER OF CARE
"Anesthesia Transfer of Care Note    Patient: Lalo Tran    Procedure(s) Performed: Procedure(s) (LRB):  ORIF, FINGER, Small finger (Right)  ORIF, FRACTURE, RADIUS, DISTAL    Patient location: PACU    Anesthesia Type: general    Transport from OR: Transported from OR on 6-10 L/min O2 by face mask with adequate spontaneous ventilation    Post pain: adequate analgesia    Post assessment: no apparent anesthetic complications    Post vital signs: stable    Level of consciousness: awake    Nausea/Vomiting: no nausea/vomiting    Complications: none    Transfer of care protocol was followed      Last vitals:   Visit Vitals  BP (!) 143/80 (BP Location: Left arm, Patient Position: Lying)   Pulse 72   Temp 36.8 °C (98.2 °F) (Oral)   Resp 20   Ht 5' 9" (1.753 m)   Wt 91.2 kg (201 lb)   SpO2 99%   BMI 29.68 kg/m²     "

## 2023-11-10 NOTE — PLAN OF CARE
VSS. No c/o pain, RUE in sling due to peripheral nerve block.  Instructions complete, verb. Understanding, handout to pt and family.  Delay in d/c waiting on ride.  Pt escorted out in wheelchair when ride arrived.

## 2023-11-10 NOTE — PLAN OF CARE
Need site marked, updated h&p and anesthesia consent.  Wife at bedside, taking belongings when she goes back to the lobby.    Bed in lowest, locked position. Side rails up X2 with call light within reach. Questions answered. No apparent distress noted. Will continue to monitor.

## 2023-11-10 NOTE — INTERVAL H&P NOTE
The patient has been examined and the H&P has been reviewed:    I concur with the findings and no changes have occurred since H&P was written.    Surgery risks, benefits and alternative options discussed and understood by patient/family.          Active Hospital Problems    Diagnosis  POA    *Open displaced fracture of proximal phalanx of right little finger [S62.796T]  Yes    Closed fracture of right distal radius [S52.501A]  Yes      Resolved Hospital Problems   No resolved problems to display.

## 2023-11-13 VITALS
OXYGEN SATURATION: 96 % | WEIGHT: 201 LBS | TEMPERATURE: 98 F | SYSTOLIC BLOOD PRESSURE: 138 MMHG | HEART RATE: 66 BPM | RESPIRATION RATE: 18 BRPM | BODY MASS INDEX: 29.77 KG/M2 | HEIGHT: 69 IN | DIASTOLIC BLOOD PRESSURE: 76 MMHG
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
## 2023-11-13 NOTE — ANESTHESIA POSTPROCEDURE EVALUATION
Anesthesia Post Evaluation    Patient: Lalo Tran    Procedure(s) Performed: Procedure(s) (LRB):  ORIF, FINGER, Small finger (Right)  ORIF, FRACTURE, RADIUS, DISTAL    Final Anesthesia Type: general      Patient location during evaluation: PACU  Patient participation: Yes- Able to Participate  Level of consciousness: awake and alert  Post-procedure vital signs: reviewed and stable  Pain management: adequate  Airway patency: patent  DULCE mitigation strategies: Multimodal analgesia  PONV status at discharge: No PONV  Anesthetic complications: no      Cardiovascular status: blood pressure returned to baseline  Respiratory status: unassisted  Hydration status: euvolemic  Follow-up not needed.          Vitals Value Taken Time   /76 11/10/23 1501   Temp 97.2 11/13/23 1122   Pulse 70 11/10/23 1515   Resp 18 11/10/23 1500   SpO2 96 % 11/10/23 1515   Vitals shown include unvalidated device data.      Event Time   Out of Recovery 15:03:00         Pain/Raymond Score: No data recorded
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
## 2023-11-15 ENCOUNTER — TELEPHONE (OUTPATIENT)
Dept: ORTHOPEDICS | Facility: CLINIC | Age: 38
End: 2023-11-15
Payer: MEDICAID

## 2023-11-15 DIAGNOSIS — Z98.890 POST-OPERATIVE STATE: Primary | ICD-10-CM

## 2023-11-15 RX ORDER — TRAMADOL HYDROCHLORIDE 50 MG/1
50 TABLET ORAL EVERY 6 HOURS PRN
Qty: 7 TABLET | Refills: 0 | Status: SHIPPED | OUTPATIENT
Start: 2023-11-15 | End: 2023-11-16 | Stop reason: SDUPTHER

## 2023-11-15 NOTE — TELEPHONE ENCOUNTER
Spoke to patient and advised  him that I would speak to our provider about calling in tramadol----- Message from Balaji Marcum sent at 11/15/2023  3:32 PM CST -----  Regarding: Refill Request    Who Called: Patient        New Prescription or Refill : Refill    RX Name and Strength:    oxyCODONE (ROXICODONE) 5 MG immediate release tablet               RX Name and Strength:         RX Name and Strength:      30 day or 90 day RX:     Preferred Pharmacy:[facility]/PHARMACY #[id number] [place]  [address]        Would the patient rather a call back or a response via [facility]?    Best Call Back Number:  [phone]     Additional Information:

## 2023-11-16 ENCOUNTER — PATIENT MESSAGE (OUTPATIENT)
Dept: ORTHOPEDICS | Facility: CLINIC | Age: 38
End: 2023-11-16
Payer: MEDICAID

## 2023-11-16 ENCOUNTER — TELEPHONE (OUTPATIENT)
Dept: ORTHOPEDICS | Facility: CLINIC | Age: 38
End: 2023-11-16
Payer: MEDICAID

## 2023-11-16 DIAGNOSIS — Z98.890 POST-OPERATIVE STATE: ICD-10-CM

## 2023-11-16 RX ORDER — TRAMADOL HYDROCHLORIDE 50 MG/1
50 TABLET ORAL EVERY 6 HOURS PRN
Qty: 7 TABLET | Refills: 0 | Status: SHIPPED | OUTPATIENT
Start: 2023-11-16 | End: 2023-11-29

## 2023-11-16 NOTE — TELEPHONE ENCOUNTER
Spoke to patient and informed him that we sent in the script for Tramadol----- Message from Fanny Aguirre MA sent at 11/16/2023 11:33 AM CST -----  Name of Who is Calling:  JEAN PIERRE CHEN [[id number]]              What is the request in detail: Pt states [facility] on [address] in [place] does not have his traMADoL (ULTRAM) 50 mg tablet. Please assist.                Can the clinic reply by [facility]: No                What Number [facility]: [phone]

## 2023-11-20 ENCOUNTER — TELEPHONE (OUTPATIENT)
Dept: ORTHOPEDICS | Facility: CLINIC | Age: 38
End: 2023-11-20
Payer: MEDICAID

## 2023-11-20 NOTE — TELEPHONE ENCOUNTER
Left pt vm asking for call back to go over pain assessment questions    ----- Message from Estefania Blake sent at 11/20/2023 12:17 PM CST -----  Regarding: Rx refill request  Contact: @ [phone]  Pt is calling to speak to someone in the office to request a refill to be sent to the pharmacy. Pt would like to be called when the script has been sent in. Please call to advise. Thanks.         Refill Needed: traMADoL (ULTRAM) 50 mg tablet (pt is wanting more than 7 tablets)     Pharmacy:  [facility]/PHARMACY #[id number] - [place]  [address]  Phone: [phone]    Fax: [phone]    Additional Information:  Pt is asking for an earlier appt than 11/29/2023 because he's in a lot of pain and discomfort

## 2023-11-29 ENCOUNTER — OFFICE VISIT (OUTPATIENT)
Dept: ORTHOPEDICS | Facility: CLINIC | Age: 38
End: 2023-11-29
Payer: MEDICAID

## 2023-11-29 VITALS — HEIGHT: 69 IN | WEIGHT: 201.06 LBS | BODY MASS INDEX: 29.78 KG/M2

## 2023-11-29 DIAGNOSIS — M79.641 PAIN OF RIGHT HAND: Primary | ICD-10-CM

## 2023-11-29 DIAGNOSIS — S52.501A CLOSED FRACTURE OF DISTAL END OF RIGHT RADIUS, UNSPECIFIED FRACTURE MORPHOLOGY, INITIAL ENCOUNTER: ICD-10-CM

## 2023-11-29 DIAGNOSIS — Z98.890 S/P ORIF (OPEN REDUCTION INTERNAL FIXATION) FRACTURE: ICD-10-CM

## 2023-11-29 DIAGNOSIS — S62.616B OPEN DISPLACED FRACTURE OF PROXIMAL PHALANX OF RIGHT LITTLE FINGER, INITIAL ENCOUNTER: ICD-10-CM

## 2023-11-29 DIAGNOSIS — Z98.890 POST-OPERATIVE STATE: Primary | ICD-10-CM

## 2023-11-29 DIAGNOSIS — Z87.81 S/P ORIF (OPEN REDUCTION INTERNAL FIXATION) FRACTURE: ICD-10-CM

## 2023-11-29 PROCEDURE — 1159F MED LIST DOCD IN RCRD: CPT | Mod: CPTII,,, | Performed by: SPECIALIST/TECHNOLOGIST

## 2023-11-29 PROCEDURE — 99999 PR PBB SHADOW E&M-EST. PATIENT-LVL III: CPT | Mod: PBBFAC,,, | Performed by: SPECIALIST/TECHNOLOGIST

## 2023-11-29 PROCEDURE — 99999 PR PBB SHADOW E&M-EST. PATIENT-LVL III: ICD-10-PCS | Mod: PBBFAC,,, | Performed by: SPECIALIST/TECHNOLOGIST

## 2023-11-29 PROCEDURE — 99024 PR POST-OP FOLLOW-UP VISIT: ICD-10-PCS | Mod: ,,, | Performed by: SPECIALIST/TECHNOLOGIST

## 2023-11-29 PROCEDURE — 99213 OFFICE O/P EST LOW 20 MIN: CPT | Mod: PBBFAC | Performed by: SPECIALIST/TECHNOLOGIST

## 2023-11-29 PROCEDURE — 99024 POSTOP FOLLOW-UP VISIT: CPT | Mod: ,,, | Performed by: SPECIALIST/TECHNOLOGIST

## 2023-11-29 PROCEDURE — 1159F PR MEDICATION LIST DOCUMENTED IN MEDICAL RECORD: ICD-10-PCS | Mod: CPTII,,, | Performed by: SPECIALIST/TECHNOLOGIST

## 2023-11-29 RX ORDER — IBUPROFEN 800 MG/1
800 TABLET ORAL 3 TIMES DAILY
Qty: 30 TABLET | Refills: 0 | Status: SHIPPED | OUTPATIENT
Start: 2023-11-29 | End: 2024-02-21 | Stop reason: HOSPADM

## 2023-11-29 RX ORDER — TRAMADOL HYDROCHLORIDE 50 MG/1
50 TABLET ORAL EVERY 6 HOURS
Qty: 10 TABLET | Refills: 0 | Status: SHIPPED | OUTPATIENT
Start: 2023-11-29 | End: 2024-02-21 | Stop reason: HOSPADM

## 2023-11-29 NOTE — PROGRESS NOTES
"Mr. Tran is here today for a post-operative visit.  He is 19 days status post R Small Finger Proximal Phalanx ORIF and R Distal Radius ORIF by Dr. Meza on 11/10/23. He reports that he is having continued pain post op. He states that he has run out of all pain medications and has been unable to get any more. He has been taking over the counter Tylenol without any relief.  He states he took his post op dressing down two weeks ago and has been cleaning his wrist/hand with Hydrogen Peroxide. He states he would then put his post op dressing plaster splint back on. He denies fever, chills, and sweats since the time of the surgery.     Physical exam:    Vitals:    11/29/23 0844   Weight: 91.2 kg (201 lb 1 oz)   Height: 5' 9" (1.753 m)   PainSc:   6   PainLoc: Hand     Vital signs are stable, patient is afebrile.  Patient is well dressed and well groomed, no acute distress.  Alert and oriented to person, place, and time.  Post op dressing taken down.  Incision is clean, dry and intact.  There is no erythema or exudate.  There is no sign of any infection. He is NVI. Sutures removed without difficulty. Small finger has limited ROM, with resting in flexion position. Has motion of PIP, DIP and MCP joint. Good motion through other fingers.     Assessment:  s/p R Small Finger Proximal Phalanx ORIF and R Distal Radius ORIF      Lalo was seen today for pain and swelling.    Diagnoses and all orders for this visit:    Post-operative state  -     traMADoL (ULTRAM) 50 mg tablet; Take 1 tablet (50 mg total) by mouth every 6 (six) hours.  -     ibuprofen (ADVIL,MOTRIN) 800 MG tablet; Take 1 tablet (800 mg total) by mouth 3 (three) times daily.  -     Ambulatory referral/consult to Physical/Occupational Therapy; Future      - PO instruction reviewed and provided to patient  - Patient placed in ulna gutter brace.   - Will begin therapy this week at [facility], with custom orthosis to be made.   - Educated patient on scar " massage.  - Educated patient on ROM exercises of PIP and DIP joints of the small finger.   - Ordered pain medication.     Patient voiced understanding and is in agreement with plan.

## 2023-12-01 ENCOUNTER — CLINICAL SUPPORT (OUTPATIENT)
Dept: REHABILITATION | Facility: HOSPITAL | Age: 38
End: 2023-12-01
Attending: SPECIALIST/TECHNOLOGIST
Payer: MEDICAID

## 2023-12-01 DIAGNOSIS — M25.531 RIGHT WRIST PAIN: ICD-10-CM

## 2023-12-01 DIAGNOSIS — Z98.890 POST-OPERATIVE STATE: ICD-10-CM

## 2023-12-01 DIAGNOSIS — M79.641 RIGHT HAND PAIN: Primary | ICD-10-CM

## 2023-12-01 PROCEDURE — L3808 WHFO, RIGID W/O JOINTS: HCPCS | Mod: PO

## 2023-12-01 NOTE — PROGRESS NOTES
OCCUPATIONAL THERAPY --- ORTHOTIC EVALUATION - FITTING - TRAINING     Patient: Lalo Tran  Date of Evaluation: 12/1/2023  MRN: [id number]  Diagnosis:   Encounter Diagnoses   Name Primary?    Post-operative state     Right hand pain Yes    Right wrist pain      Physician: Alex Marino PA-C    SUBJECTIVE:   I have been taking the splints I was given off to clean it but otherwise have kept them on    Pain: Pt did not report score /10    OBJECTIVE:     Observations: Swelling throughout hand and SF    Range of Motion (in degrees):Deferred    Strength (in pounds): Deferred       Circumferential Edema Measurements (in cm): Deferred       ADLs/Function:      ASSESSMENT:   Initial OT evaluation completed.  Fabricated custom ulnar gutter dorsal forearm based wrist cock up orthosis , per MD orders.  Instructed in orthotic use, wear, care and precautions in detail w/ patient.  HEP given to begin active range of motion of hand.    Rehab Potential: good    GOALS: to be determined on evaluation     PLAN:      Return for Evaluation for detailed assessment     Anastasia Weston, OTR/L, MOT  Occupational Therapist

## 2023-12-05 ENCOUNTER — DOCUMENTATION ONLY (OUTPATIENT)
Dept: REHABILITATION | Facility: HOSPITAL | Age: 38
End: 2023-12-05

## 2023-12-05 NOTE — PROGRESS NOTES
Patient no-showed today's appointment; appointment was for 12/5/23 at 9:00 AM. Called pt to review appointments and no show policy; pt stated he called the office this morning around 7:15-7:30 AM to reschedule but was unable to reach  staff. Rescheduled appointment for tomorrow at 11:15 AM.

## 2023-12-06 ENCOUNTER — CLINICAL SUPPORT (OUTPATIENT)
Dept: REHABILITATION | Facility: HOSPITAL | Age: 38
End: 2023-12-06
Payer: MEDICAID

## 2023-12-06 DIAGNOSIS — M79.641 RIGHT HAND PAIN: Primary | ICD-10-CM

## 2023-12-06 DIAGNOSIS — M25.531 RIGHT WRIST PAIN: ICD-10-CM

## 2023-12-06 PROCEDURE — 97166 OT EVAL MOD COMPLEX 45 MIN: CPT | Mod: PO

## 2023-12-06 PROCEDURE — 97530 THERAPEUTIC ACTIVITIES: CPT | Mod: PO

## 2023-12-06 NOTE — PATIENT INSTRUCTIONS
AROM: Forearm Pronation / Supination        With arm in handshake position, slowly rotate palm down until stretch is felt. Relax. Then rotate palm up until stretch is felt.  Repeat 20 times. Do 5 sessions per day.      Extension (Active With Finger Extension)        With forearm on table and wrist over edge, lift hand with fingers straight.   Repeat 20 times. Do 5 sessions per day.    Radial / Ulnar Deviation (Assistive)        Move hand side to side like a windshield wiper. Do not move elbow.  Repeat 20 times. Do 5 sessions per day.    Circumduction (Active)        With fingers curled, move slowly at wrist in clock- wise circles 20 times. Repeat counterclockwise. Do not move elbow or shoulder.  Do 5 sessions per day.    MP Flexion (Active)        With back of hand on table, bend large knuckles as far as they will go, keeping small joints straight.  Repeat 20 times. Do 5 sessions per day.  Activity: Reach into a narrow container.*    PIP Flexion (Active Blocked)        Hold large knuckle straight using other hand. Bend middle joint of small finger as far as possible.   Repeat 20 times. Do 5 sessions per day.  Activity: Curl fingers around a jar cap.*     DIP Flexion (Active Blocked)        Hold small finger firmly at the middle so that only the tip joint can bend.   Repeat 20 times. Do 5 sessions per day.       Flexor Tendon Gliding (Active Full Fist)        Straighten all fingers, then make a fist, bending all joints.  Repeat 20 times. Do 5 sessions per day.      MP Extension (Active)        With palm on table, straighten fingers completely at large knuckles, and lift fingers individually off table.   Repeat 20 times. Do 5 sessions per day.      PIP Extension (Active)        Hold small finger with other hand. Straighten finger fully at middle joint.  Repeat 20 times. Do 5 sessions per day.     DIP Extension (Active)        Brace small finger below tip joint. Extend fingertip as far as possible.   Repeat 20 times.  Do 5 sessions per day.     Abduction / Adduction (Active)        With hand flat on table, spread all fingers apart, then bring them together as close as possible.  Repeat 20 times. Do 5 sessions per day.
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
## 2023-12-08 ENCOUNTER — DOCUMENTATION ONLY (OUTPATIENT)
Dept: REHABILITATION | Facility: HOSPITAL | Age: 38
End: 2023-12-08
Payer: MEDICAID

## 2023-12-12 ENCOUNTER — CLINICAL SUPPORT (OUTPATIENT)
Dept: REHABILITATION | Facility: HOSPITAL | Age: 38
End: 2023-12-12
Payer: MEDICAID

## 2023-12-12 DIAGNOSIS — M25.531 RIGHT WRIST PAIN: ICD-10-CM

## 2023-12-12 DIAGNOSIS — M79.641 RIGHT HAND PAIN: Primary | ICD-10-CM

## 2023-12-12 PROCEDURE — 97530 THERAPEUTIC ACTIVITIES: CPT | Mod: PO

## 2023-12-12 NOTE — PROGRESS NOTES
Occupational Therapy Daily Treatment Note     Date: 12/12/2023  Name: Lalo Tran  [facility] Number: [id number]    Therapy Diagnosis:   Encounter Diagnoses   Name Primary?    Right hand pain Yes    Right wrist pain      Physician: Alex Marino PA-C    Medical Diagnosis:   Open displaced fracture of proximal phalanx of right little finger, initial encounter [S62.616B]  Closed fracture of distal end of right radius, unspecified fracture morphology, initial encounter [S52.501A]  Physician Orders: L Distal Radius Fracture and Proximal Phalanx of Small Finger ORIF  DOS: 11/10/23  12 Visits  Evaluation Date: 12/6/2023  Plan of Care Certification Date: 3/6/2024  Authorization Period: 12/31/2023  Surgery Date and Procedure: L Distal Radius Fracture and Proximal Phalanx of Small Finger ORIF, DOS: 11/10/23  Date of Return to MD: 12/27/2023    Visit # / Visits authorized: 2 / 20  Time In:8:17 am  Time Out: 9:02 am  Total Billable Time: 45 minutes    Precautions:  Standard      Subjective     Pt reports: Manda been keeping it clean; no problems with my exercises  Response to previous treatment:lokesh well    Pain: Pt did not report pain score/10  Location: SF     Objective     Lalo participated in dynamic functional therapeutic activities to improve functional performance for 45  minutes, including:  -Patient received fluidotherapy to RUE hand(s) for 10 minutes to increase blood flow, circulation, desensitization, sensory re-education and for pain management.   -retrograde massage to decrease edema within SF/hand/wrist  -passive range of motion gentle MP flexion, composite fist digits 2-4, gentle PIP flexion/extension  -active range of motion composite fist 20x  -active range of motion  finger spreads 20x  -active range of motion finger lifts 20x each finger  -blocked SF PIP flexion, DIP flexion 20x each  -Large pom poms opposition to SF MP 1 container  -Juxicisor 3'  -Octi 3'  -New hooks added to splint       Home Exercises and  Education Provided     Education provided:   - Use of heat  - Progress towards goals     Written Home Exercises Provided: Patient instructed to cont prior HEP.  Exercises were reviewed and Lalo was able to demonstrate them prior to the end of the session.  Lalo demonstrated good  understanding of the HEP provided.   .   See EMR under Patient Instructions for exercises provided prior visit.        Assessment     Pt would continue to benefit from skilled OT to maximize right upper extremity functioning. Pt reported discomfort with SF passive range of motion but stated it decreased when movement was shortened and slowed. Pt remains with SF PIP flexor contracture. Instructed pt to bring splint in next week for adjustments to address PIP flexor contracture of SF.    Lalo is progressing towards his goals and there are no updates to goals at this time. Pt prognosis is Good.     Pt will continue to benefit from skilled outpatient occupational therapy to address the deficits listed in the problem list on initial evaluation provide pt/family education and to maximize pt's level of independence in the home and community environment.     Anticipated barriers to therapy: Severity of injuries       Pt's spiritual, cultural and educational needs considered and pt agreeable to plan of care and goals.    Goals:   LTG's (12 weeks):  1)   Increase ROM 30 degrees in right WE/WF to increase functional hand use for work and ADL's.  2)   Increase NELSON 80 degrees in right SF to increase functional hand use for grasping hand tools.  3)   Increase  strength 50 lbs. to grasp hammer.  4)   Increase lat pinch 5 psis for opening water bottles.  5)   Decrease complaints of pain to  3 out of 10 at worst to increase functional hand use for ADL/work/leisure activities.  6)   Pt will return to near to prior level of function for work activities reporting I or Mod I.      STG's (6 weeks)  1)   Patient to be IND with HEP and modalities for  pain/edema managment.  2)   Increase ROM 15 degrees in right WE/WF to increase functional hand use for work and ADL's.  3)   Increase NELSON 40 degrees in right SF to increase functional hand use for frasping hand tools.  4)   Increase ROM to WNL in right LF-RF to WNL to increase functional hand use for grasping bucket handles.  5)   Increase  strength 25 lbs. to improve functional grasp for ADLs/work/leisure activities.   6)   Increase lat pinch 2 psi's to increase independence with button and FM Coordination.   7)   Patient to be IND with Orthotic use, wear and care precautions.   8)   Decrease complaints of pain to  5 out of 10 at worst to increase functional hand use for ADL/work/leisure activities.    Plan   Cont OT to address above goals.    Anastasia Weston, OT

## 2023-12-15 ENCOUNTER — CLINICAL SUPPORT (OUTPATIENT)
Dept: REHABILITATION | Facility: HOSPITAL | Age: 38
End: 2023-12-15
Payer: MEDICAID

## 2023-12-15 DIAGNOSIS — M79.641 RIGHT HAND PAIN: Primary | ICD-10-CM

## 2023-12-15 DIAGNOSIS — M25.531 RIGHT WRIST PAIN: ICD-10-CM

## 2023-12-15 PROCEDURE — 97530 THERAPEUTIC ACTIVITIES: CPT | Mod: PO

## 2023-12-15 NOTE — PROGRESS NOTES
Occupational Therapy Daily Treatment Note     Date: 12/15/2023  Name: Lalo Tran  [facility] Number: [id number]    Therapy Diagnosis:   Encounter Diagnoses   Name Primary?    Right hand pain Yes    Right wrist pain      Physician: Alex Marino PA-C    Medical Diagnosis:   Open displaced fracture of proximal phalanx of right little finger, initial encounter [S62.616B]  Closed fracture of distal end of right radius, unspecified fracture morphology, initial encounter [S52.501A]  Physician Orders: L Distal Radius Fracture and Proximal Phalanx of Small Finger ORIF  DOS: 11/10/23  12 Visits  Evaluation Date: 12/6/2023  Plan of Care Certification Date: 3/6/2024  Authorization Period: 12/31/2023  Surgery Date and Procedure: L Distal Radius Fracture and Proximal Phalanx of Small Finger ORIF, DOS: 11/10/23  Date of Return to MD: 12/27/2023    Visit # / Visits authorized: 3 / 20  Time In:8:17 am  Time Out: 9:02 am  Total Billable Time: 45 minutes    Precautions:  Standard      Subjective     Pt reports: It feels a little looser  Response to previous treatment:lokesh well    Pain: Pt did not report pain score/10  Location: [facility] PIP    Objective     Lalo participated in dynamic functional therapeutic activities to improve functional performance for 45  minutes, including:  -Patient received fluidotherapy to RUE hand(s) for 10 minutes to increase blood flow, circulation, desensitization, sensory re-education and for pain management.   -Myofascial cupping to incision site to decrease scar tissue adhesions and draw out remaining stitch  -Massage vibrator to incision site to flatten scar tissue at incision site  -performed  Instrument Assisted Soft Tissue Mobilization  stimulating tissue turnover, scar tissue resorption, and the regeneration of tendons, cross friction massage of  incision site and throughout musculature  x 8  minutes    -passive range of motion gentle WF/WE, MP flexion, composite fist digits 2-4, gentle PIP  flexion/extension  -active range of motion wrist circles 20x both ways  -active range of motion composite fist 20x  -active range of motion finger lifts 20x each finger  -blocked SF PIP flexion, DIP flexion 20x each  -Coins for palm to finger translation and fx fist with SF flexion , 1 container    Home Exercises and Education Provided     Education provided:   - Use of heat  - Progress towards goals     Written Home Exercises Provided: Patient instructed to cont prior HEP.  Exercises were reviewed and Lalo was able to demonstrate them prior to the end of the session.  May demonstrated good  understanding of the HEP provided.   .   See EMR under Patient Instructions for exercises provided prior visit.        Assessment     Pt would continue to benefit from skilled OT to maximize right upper extremity functioning. Pt arrived without splint this session. Instructed to wear and bring in next session for adjustment to address PIP flexion contracture. Pt with one dissolvable stitch emerging at the proximal end of scar and instructed to keep an eye on it for future removal. Pt with palpable scar tissue at incision site which was focused on this session with pt grimacing and pulling away throughout manual techniques. Pt stated he had not been performing massage as instructed. Provided education on importance of scar massage and advised to perform for 3-5 minutes several times a day. Reviewed pressure and technique for scar massage with pt tiana to demonstrate correct performance. Pt with difficulty performing fine motor coin task; instructed to practice at home to work on functional grasp.    Lalo is progressing towards his goals and there are no updates to goals at this time. Pt prognosis is Good.     Pt will continue to benefit from skilled outpatient occupational therapy to address the deficits listed in the problem list on initial evaluation provide pt/family education and to maximize pt's level of  independence in the home and community environment.     Anticipated barriers to therapy: Severity of injuries, compliance       Pt's spiritual, cultural and educational needs considered and pt agreeable to plan of care and goals.    Goals:   LTG's (12 weeks):  1)   Increase ROM 30 degrees in right WE/WF to increase functional hand use for work and ADL's.  2)   Increase NELSON 80 degrees in right SF to increase functional hand use for grasping hand tools.  3)   Increase  strength 50 lbs. to grasp hammer.  4)   Increase lat pinch 5 psis for opening water bottles.  5)   Decrease complaints of pain to  3 out of 10 at worst to increase functional hand use for ADL/work/leisure activities.  6)   Pt will return to near to prior level of function for work activities reporting I or Mod I.      STG's (6 weeks)  1)   Patient to be IND with HEP and modalities for pain/edema managment.  2)   Increase ROM 15 degrees in right WE/WF to increase functional hand use for work and ADL's.  3)   Increase NELSON 40 degrees in right SF to increase functional hand use for frasping hand tools.  4)   Increase ROM to WNL in right LF-RF to WNL to increase functional hand use for grasping bucket handles.  5)   Increase  strength 25 lbs. to improve functional grasp for ADLs/work/leisure activities.   6)   Increase lat pinch 2 psi's to increase independence with button and FM Coordination.   7)   Patient to be IND with Orthotic use, wear and care precautions.   8)   Decrease complaints of pain to  5 out of 10 at worst to increase functional hand use for ADL/work/leisure activities.    Plan   Cont OT to address above goals.    Anastasia Weston, OT

## 2023-12-19 ENCOUNTER — PATIENT MESSAGE (OUTPATIENT)
Dept: REHABILITATION | Facility: HOSPITAL | Age: 38
End: 2023-12-19
Payer: MEDICAID

## 2023-12-19 ENCOUNTER — CLINICAL SUPPORT (OUTPATIENT)
Dept: REHABILITATION | Facility: HOSPITAL | Age: 38
End: 2023-12-19
Payer: MEDICAID

## 2023-12-19 DIAGNOSIS — M79.641 RIGHT HAND PAIN: Primary | ICD-10-CM

## 2023-12-19 DIAGNOSIS — M25.531 RIGHT WRIST PAIN: ICD-10-CM

## 2023-12-19 PROCEDURE — 97530 THERAPEUTIC ACTIVITIES: CPT | Mod: PO

## 2023-12-19 NOTE — PROGRESS NOTES
Occupational Therapy Daily Treatment Note     Date: 12/19/2023  Name: Lalo Tran  [facility] Number: [id number]    Therapy Diagnosis:   Encounter Diagnoses   Name Primary?    Right hand pain Yes    Right wrist pain      Physician: Alex Marino PA-C    Medical Diagnosis:   Open displaced fracture of proximal phalanx of right little finger, initial encounter [S62.616B]  Closed fracture of distal end of right radius, unspecified fracture morphology, initial encounter [S52.501A]  Physician Orders: L Distal Radius Fracture and Proximal Phalanx of Small Finger ORIF  DOS: 11/10/23  12 Visits  Evaluation Date: 12/6/2023  Plan of Care Certification Date: 3/6/2024  Authorization Period: 12/31/2023  Surgery Date and Procedure: L Distal Radius Fracture and Proximal Phalanx of Small Finger ORIF, DOS: 11/10/23  Date of Return to MD: 12/27/2023    Visit # / Visits authorized: 4 / 20  Time In:8:20 am (Pt arrived late)  Time Out: 9:05 am  Total Billable Time: 45 minutes    Precautions:  Standard      Subjective     Pt reports: I wear the splint when Im doing heavy work around the house but otherwise keep it off  Response to previous treatment:lokesh well    Pain: 3/10  Location: [facility] PIP    Objective     Lalo participated in dynamic functional therapeutic activities to improve functional performance for 45  minutes, including:  -Patient received fluidotherapy to RUE hand(s) for 10 minutes to increase blood flow, circulation, desensitization, sensory re-education and for pain management.   -performed  Instrument Assisted Soft Tissue Mobilization  stimulating tissue turnover, scar tissue resorption, and the regeneration of tendons, cross friction massage of  PIP joint and throughout musculature  x 8  minutes    -Self stretch WF/WE 10x each- added to HEP  -blocked SF PIP flexion, DIP flexion 20x each  -Fabricated finger gutter as static progressive PIP extension   -Education on coband wrap for edema reduction    SF Pre Tx:  MP:  0/25  PIP: 50/59  DIP: 0/30    Home Exercises and Education Provided     Education provided:   - Use of heat  - Progress towards goals     Written Home Exercises Provided: Patient instructed to cont prior HEP.  Exercises were reviewed and Lalo was able to demonstrate them prior to the end of the session.  Lalo demonstrated good  understanding of the HEP provided.   .   See EMR under Patient Instructions for exercises provided prior visit.        Assessment     Pt would continue to benefit from skilled OT to maximize right upper extremity functioning. Pt edema continues to reduce but still unable to fit into LMB 2/2 edema and PIP flexion contracture. Fabricated static progressive finger gutter for increased PIP extension and instructed to wear 24/7 taking off for hygiene. PIP at 46 degrees in orthosis- plan to increase extension by 10 degrees next session. Instructed on coband wrap for edema reduction with pt verbalizing understanding and performing skin checks. Palpable decrease in scar tissue at incision site  with pt stating he was working on it at home diligently.    Lalo is progressing towards his goals and there are no updates to goals at this time. Pt prognosis is Good.     Pt will continue to benefit from skilled outpatient occupational therapy to address the deficits listed in the problem list on initial evaluation provide pt/family education and to maximize pt's level of independence in the home and community environment.     Anticipated barriers to therapy: Severity of injuries, compliance       Pt's spiritual, cultural and educational needs considered and pt agreeable to plan of care and goals.    Goals:   LTG's (12 weeks):  1)   Increase ROM 30 degrees in right WE/WF to increase functional hand use for work and ADL's. progressing not met 12/19/2023  2)   Increase NELSON 80 degrees in right SF to increase functional hand use for grasping hand tools. progressing not met 12/19/2023  3)   Increase   strength 50 lbs. to grasp hammer. progressing not met 12/19/2023  4)   Increase lat pinch 5 psis for opening water bottles. progressing not met 12/19/2023  5)   Decrease complaints of pain to  3 out of 10 at worst to increase functional hand use for ADL/work/leisure activities. progressing not met 12/19/2023  6)   Pt will return to near to prior level of function for work activities reporting I or Mod I.  progressing not met 12/19/2023     STG's (6 weeks)  1)   Patient to be IND with HEP and modalities for pain/edema managment. progressing not met 12/19/2023  2)   Increase ROM 15 degrees in right WE/WF to increase functional hand use for work and ADL's. progressing not met 12/19/2023  3)   Increase NELSON 40 degrees in right SF to increase functional hand use for frasping hand tools. progressing not met 12/19/2023   4)   Increase ROM to WNL in right LF-RF to WNL to increase functional hand use for grasping bucket handles. progressing not met 12/19/2023  5)   Increase  strength 25 lbs. to improve functional grasp for ADLs/work/leisure activities. progressing not met 12/19/2023  6)   Increase lat pinch 2 psi's to increase independence with button and FM Coordination. progressing not met 12/19/2023  7)   Patient to be IND with Orthotic use, wear and care precautions. progressing not met 12/19/2023  8)   Decrease complaints of pain to  5 out of 10 at worst to increase functional hand use for ADL/work/leisure activities. progressing not met 12/19/2023      Plan   Cont OT to address above goals.    Anastasia Weston, OT

## 2023-12-19 NOTE — PATIENT INSTRUCTIONS
Low load stretch with bag of race.    Place hand with wrist hanging off of rolled up towel or off edge of table. Place heating pad over wrist with. Place bag of rice on top to have low load stretch. Perform for 10 minutes. To stretch wrist extensors, perform palm down. To stretch wrist flexors, perform palm up.    Extension (Passive)        Keep palm on table, using other hand on top to assist. Raise elbow. Hold 10 seconds.  Repeat 10 times. Do 3 sessions per day.  Activity: Resting hand with palm on hip, move elbow out to side.*       Flexion (Passive)        With palm on table near edge, hold steady with other hand on top. Lower elbow. Hold 10 seconds.  Repeat 10 times. Do 3 sessions per day.    PIP Extension (Passive)        Use thumb of other hand on top of joint and two fingers under- neath on either side to straighten middle joint of pinky finger. Hold 10 seconds.  Repeat 10 times. Do 3 sessions per day.

## 2023-12-21 ENCOUNTER — TELEPHONE (OUTPATIENT)
Dept: ORTHOPEDICS | Facility: CLINIC | Age: 38
End: 2023-12-21
Payer: MEDICAID

## 2023-12-22 ENCOUNTER — CLINICAL SUPPORT (OUTPATIENT)
Dept: REHABILITATION | Facility: HOSPITAL | Age: 38
End: 2023-12-22
Payer: MEDICAID

## 2023-12-22 DIAGNOSIS — M25.531 RIGHT WRIST PAIN: ICD-10-CM

## 2023-12-22 DIAGNOSIS — M79.641 RIGHT HAND PAIN: Primary | ICD-10-CM

## 2023-12-22 PROCEDURE — 97530 THERAPEUTIC ACTIVITIES: CPT | Mod: PO

## 2023-12-22 NOTE — PROGRESS NOTES
Occupational Therapy Daily Treatment Note     Date: 12/22/2023  Name: Lalo Tran  [facility] Number: [id number]    Therapy Diagnosis:   Encounter Diagnoses   Name Primary?    Right hand pain Yes    Right wrist pain      Physician: Alex Marino PA-C    Medical Diagnosis:   Open displaced fracture of proximal phalanx of right little finger, initial encounter [S62.616B]  Closed fracture of distal end of right radius, unspecified fracture morphology, initial encounter [S52.501A]  Physician Orders: L Distal Radius Fracture and Proximal Phalanx of Small Finger ORIF  DOS: 11/10/23  12 Visits  Evaluation Date: 12/6/2023  Plan of Care Certification Date: 3/6/2024  Authorization Period: 12/31/2023  Surgery Date and Procedure: L Distal Radius Fracture and Proximal Phalanx of Small Finger ORIF, DOS: 11/10/23  Date of Return to MD: 12/27/2023    Visit # / Visits authorized: 5 / 20  Time In:8:20 am (Pt arrived late)  Time Out: 9:30 am  Total Billable Time: 70  minutes    Precautions:  Standard      Subjective     Pt reports: I wear the splint when Im doing heavy work around the house but otherwise keep it off  Response to previous treatment:lokesh well    Pain: 3/10  Location: [facility] PIP    Objective     Lalo participated in dynamic functional therapeutic activities to improve functional performance for 45  minutes, including:    -Patient received fluidotherapy to RUE hand(s) for 10 minutes to increase blood flow, circulation, desensitization, sensory re-education and for pain management.   -performed  Instrument Assisted Soft Tissue Mobilization  stimulating tissue turnover, scar tissue resorption, and the regeneration of tendons, cross friction massage of  PIP joint and throughout musculature  x 8  minutes    -Self stretch WF/WE 10x each- added to HEP  -blocked SF PIP flexion, DIP flexion 20x each  -adjusted  finger gutter as static progressive PIP extension   - wrist AROM using dowel in hand x 20 reps each in all ranges  -  gripping yellow putty with digits 1-4 only x 10 reps  - blocking and reverse blocking x 20 reps of small finger    SF Pre Tx:  MP: 0/31  PIP: 60/65  DIP: 0/40    Home Exercises and Education Provided     Education provided:   - Use of heat  - Progress towards goals     Written Home Exercises Provided: Patient instructed to cont prior HEP.  Exercises were reviewed and Lalo was able to demonstrate them prior to the end of the session.  Lalo demonstrated good  understanding of the HEP provided.   .   See EMR under Patient Instructions for exercises provided prior visit.        Assessment     Pt would continue to benefit from skilled OT to maximize right upper extremity functioning. Pt instructed to perform wrist exercises and gripping blue sponge, however, patient for the sponge here.     Lalo is progressing towards his goals and there are no updates to goals at this time. Pt prognosis is Good.     Pt will continue to benefit from skilled outpatient occupational therapy to address the deficits listed in the problem list on initial evaluation provide pt/family education and to maximize pt's level of independence in the home and community environment.     Anticipated barriers to therapy: Severity of injuries, compliance       Pt's spiritual, cultural and educational needs considered and pt agreeable to plan of care and goals.    Goals:   LTG's (12 weeks):  1)   Increase ROM 30 degrees in right WE/WF to increase functional hand use for work and ADL's. progressing not met 12/22/2023  2)   Increase NELSON 80 degrees in right SF to increase functional hand use for grasping hand tools. progressing not met 12/22/2023  3)   Increase  strength 50 lbs. to grasp hammer. progressing not met 12/22/2023  4)   Increase lat pinch 5 psis for opening water bottles. progressing not met 12/22/2023  5)   Decrease complaints of pain to  3 out of 10 at worst to increase functional hand use for ADL/work/leisure activities.  progressing not met 12/22/2023  6)   Pt will return to near to prior level of function for work activities reporting I or Mod I.  progressing not met 12/22/2023     STG's (6 weeks)  1)   Patient to be IND with HEP and modalities for pain/edema managment. progressing not met 12/22/2023  2)   Increase ROM 15 degrees in right WE/WF to increase functional hand use for work and ADL's. progressing not met 12/22/2023  3)   Increase NELSON 40 degrees in right SF to increase functional hand use for frasping hand tools. progressing not met 12/22/2023   4)   Increase ROM to WNL in right LF-RF to WNL to increase functional hand use for grasping bucket handles. progressing not met 12/22/2023  5)   Increase  strength 25 lbs. to improve functional grasp for ADLs/work/leisure activities. progressing not met 12/22/2023  6)   Increase lat pinch 2 psi's to increase independence with button and FM Coordination. progressing not met 12/22/2023  7)   Patient to be IND with Orthotic use, wear and care precautions. progressing not met 12/22/2023  8)   Decrease complaints of pain to  5 out of 10 at worst to increase functional hand use for ADL/work/leisure activities. progressing not met 12/22/2023      Plan   Cont OT to address above goals.    Julissa Najera, OT

## 2023-12-27 ENCOUNTER — DOCUMENTATION ONLY (OUTPATIENT)
Dept: REHABILITATION | Facility: HOSPITAL | Age: 38
End: 2023-12-27
Payer: MEDICAID

## 2023-12-28 ENCOUNTER — HOSPITAL ENCOUNTER (OUTPATIENT)
Dept: RADIOLOGY | Facility: OTHER | Age: 38
Discharge: HOME | End: 2023-12-28
Attending: SPECIALIST/TECHNOLOGIST
Payer: MEDICAID

## 2023-12-28 DIAGNOSIS — M79.641 PAIN OF RIGHT HAND: ICD-10-CM

## 2023-12-28 PROCEDURE — 73140 X-RAY EXAM OF FINGER(S): CPT | Mod: TC,FY,RT

## 2023-12-28 PROCEDURE — 73130 X-RAY EXAM OF HAND: CPT | Mod: 26,RT,, | Performed by: RADIOLOGY

## 2023-12-28 PROCEDURE — 73130 X-RAY EXAM OF HAND: CPT | Mod: TC,FY,RT

## 2023-12-28 PROCEDURE — 73130 XR HAND COMPLETE 3 VIEW RIGHT: ICD-10-PCS | Mod: 26,RT,, | Performed by: RADIOLOGY

## 2023-12-28 PROCEDURE — 73140 X-RAY EXAM OF FINGER(S): CPT | Mod: 26,59,RT, | Performed by: RADIOLOGY

## 2023-12-28 PROCEDURE — 73140 XR FINGER 2 OR MORE VIEWS RIGHT: ICD-10-PCS | Mod: 26,59,RT, | Performed by: RADIOLOGY

## 2023-12-29 ENCOUNTER — CLINICAL SUPPORT (OUTPATIENT)
Dept: REHABILITATION | Facility: HOSPITAL | Age: 38
End: 2023-12-29
Payer: MEDICAID

## 2023-12-29 DIAGNOSIS — M79.641 RIGHT HAND PAIN: Primary | ICD-10-CM

## 2023-12-29 DIAGNOSIS — M25.531 RIGHT WRIST PAIN: ICD-10-CM

## 2023-12-29 PROCEDURE — 97530 THERAPEUTIC ACTIVITIES: CPT | Mod: PO

## 2023-12-29 NOTE — PROGRESS NOTES
Occupational Therapy Daily Treatment Note     Date: 12/29/2023  Name: Lalo Tran  [facility] Number: [id number]    Therapy Diagnosis:   Encounter Diagnoses   Name Primary?    Right hand pain Yes    Right wrist pain        Physician: Alex Marino PA-C    Medical Diagnosis:   Open displaced fracture of proximal phalanx of right little finger, initial encounter [S62.616B]  Closed fracture of distal end of right radius, unspecified fracture morphology, initial encounter [S52.501A]  Physician Orders: L Distal Radius Fracture and Proximal Phalanx of Small Finger ORIF  DOS: 11/10/23  12 Visits  Evaluation Date: 12/6/2023  Plan of Care Certification Date: 3/6/2024  Authorization Period: 12/31/2023  Surgery Date and Procedure: L Distal Radius Fracture and Proximal Phalanx of Small Finger ORIF, DOS: 11/10/23  Date of Return to MD: 12/27/2023    Visit # / Visits authorized: 6 / 20  Time In: 8:30 am   Time Out: 9:15 am  Total Billable Time: 45 minutes    Precautions:  Standard      Subjective     Pt reports: It feels better than it used to  Response to previous treatment:lokesh well    Pain: Pt did not report score/10  Location: [facility] PIP    Objective     Lalo participated in dynamic functional therapeutic activities to improve functional performance for 45  minutes, including:    -Patient received fluidotherapy to RUE hand(s) for 10 minutes to increase blood flow, circulation, desensitization, sensory re-education and for pain management.   -performed  Instrument Assisted Soft Tissue Mobilization  stimulating tissue turnover, scar tissue resorption, and the regeneration of tendons, cross friction massage of  PIP joint and throughout musculature  x 8  minutes    -passive range of motion gentle MP flexion, PIP flexion/extension, gentle composite flexion of SF  -Self stretch WF/WE 10x each- added to HEP  -wrist AROM using dowel in hand x 20 reps each in all ranges  -Blue sponge squeezes digits 1-4 2'  -blocking and reverse blocking  x 20 reps of small finger    SF Pre Tx:  MP: 0/19  PIP: 50/65  DIP: 0/40    Home Exercises and Education Provided     Education provided:   - Use of heat  - Progress towards goals     Written Home Exercises Provided: Patient instructed to cont prior HEP.  Exercises were reviewed and Lalo was able to demonstrate them prior to the end of the session.  Lalo demonstrated good  understanding of the HEP provided.   .   See EMR under Patient Instructions for exercises provided prior visit.        Assessment     Pt would continue to benefit from skilled OT to maximize right upper extremity functioning. Pt with inconsistent report of HEP performance/supplies. Pt with difficulty demonstrating wrist stretches from HEP; reviewed with pt demonstrating proper performance. Pt verbalized he has difficulty remembering things and therapist reviewed HEP exercises with him. Supplied pt with blue sponge and instructions on performance; however, pt left without printed HEP. Instructed pt to bring orthosis for adjustment in increased PIP extension next session.     Lalo is progressing towards his goals and there are no updates to goals at this time. Pt prognosis is Good.     Pt will continue to benefit from skilled outpatient occupational therapy to address the deficits listed in the problem list on initial evaluation provide pt/family education and to maximize pt's level of independence in the home and community environment.     Anticipated barriers to therapy: Severity of injuries, compliance       Pt's spiritual, cultural and educational needs considered and pt agreeable to plan of care and goals.    Goals:   LTG's (12 weeks):  1)   Increase ROM 30 degrees in right WE/WF to increase functional hand use for work and ADL's. progressing not met 12/29/2023  2)   Increase NELSON 80 degrees in right SF to increase functional hand use for grasping hand tools. progressing not met 12/29/2023  3)   Increase  strength 50 lbs. to grasp  hammer. progressing not met 12/29/2023  4)   Increase lat pinch 5 psis for opening water bottles. progressing not met 12/29/2023  5)   Decrease complaints of pain to  3 out of 10 at worst to increase functional hand use for ADL/work/leisure activities. progressing not met 12/29/2023  6)   Pt will return to near to prior level of function for work activities reporting I or Mod I.  progressing not met 12/29/2023     STG's (6 weeks)  1)   Patient to be IND with HEP and modalities for pain/edema managment. progressing not met 12/29/2023  2)   Increase ROM 15 degrees in right WE/WF to increase functional hand use for work and ADL's. progressing not met 12/29/2023  3)   Increase NELSON 40 degrees in right SF to increase functional hand use for frasping hand tools. progressing not met 12/29/2023   4)   Increase ROM to WNL in right LF-RF to WNL to increase functional hand use for grasping bucket handles. progressing not met 12/29/2023  5)   Increase  strength 25 lbs. to improve functional grasp for ADLs/work/leisure activities. progressing not met 12/29/2023  6)   Increase lat pinch 2 psi's to increase independence with button and FM Coordination. progressing not met 12/29/2023  7)   Patient to be IND with Orthotic use, wear and care precautions. progressing not met 12/29/2023  8)   Decrease complaints of pain to  5 out of 10 at worst to increase functional hand use for ADL/work/leisure activities. progressing not met 12/29/2023      Plan   Cont OT to address above goals.    Anastasia Weston, OT

## 2023-12-29 NOTE — PATIENT INSTRUCTIONS
Strengthening (Resistive Putty)        Squeeze sponge using thumb and all fingers.  Squeeze sponge with all fingers except pinky for 2 minutes, 3x/day.

## 2024-01-02 ENCOUNTER — DOCUMENTATION ONLY (OUTPATIENT)
Dept: REHABILITATION | Facility: HOSPITAL | Age: 39
End: 2024-01-02
Payer: MEDICAID

## 2024-01-04 ENCOUNTER — CLINICAL SUPPORT (OUTPATIENT)
Dept: REHABILITATION | Facility: HOSPITAL | Age: 39
End: 2024-01-04
Payer: MEDICAID

## 2024-01-04 ENCOUNTER — OFFICE VISIT (OUTPATIENT)
Dept: ORTHOPEDICS | Facility: CLINIC | Age: 39
End: 2024-01-04
Payer: MEDICAID

## 2024-01-04 VITALS — BODY MASS INDEX: 29.78 KG/M2 | HEIGHT: 69 IN | WEIGHT: 201.06 LBS

## 2024-01-04 DIAGNOSIS — Z98.890 POST-OPERATIVE STATE: ICD-10-CM

## 2024-01-04 DIAGNOSIS — M79.641 RIGHT HAND PAIN: Primary | ICD-10-CM

## 2024-01-04 DIAGNOSIS — M25.531 RIGHT WRIST PAIN: ICD-10-CM

## 2024-01-04 DIAGNOSIS — S62.616B OPEN DISPLACED FRACTURE OF PROXIMAL PHALANX OF RIGHT LITTLE FINGER, INITIAL ENCOUNTER: ICD-10-CM

## 2024-01-04 PROCEDURE — 99999 PR PBB SHADOW E&M-EST. PATIENT-LVL III: CPT | Mod: PBBFAC,,, | Performed by: SPECIALIST/TECHNOLOGIST

## 2024-01-04 PROCEDURE — 99213 OFFICE O/P EST LOW 20 MIN: CPT | Mod: PBBFAC | Performed by: SPECIALIST/TECHNOLOGIST

## 2024-01-04 PROCEDURE — 97530 THERAPEUTIC ACTIVITIES: CPT | Mod: PO

## 2024-01-04 PROCEDURE — 99024 POSTOP FOLLOW-UP VISIT: CPT | Mod: ,,, | Performed by: SPECIALIST/TECHNOLOGIST

## 2024-01-04 PROCEDURE — 1159F MED LIST DOCD IN RCRD: CPT | Mod: CPTII,,, | Performed by: SPECIALIST/TECHNOLOGIST

## 2024-01-04 RX ORDER — IBUPROFEN 800 MG/1
800 TABLET ORAL EVERY 8 HOURS PRN
Qty: 30 TABLET | Refills: 0 | Status: SHIPPED | OUTPATIENT
Start: 2024-01-04 | End: 2024-02-21 | Stop reason: HOSPADM

## 2024-01-04 NOTE — PROGRESS NOTES
"HPI   11/29/23  Mr. Tran is here today for a post-operative visit.  He is 19 days status post R Small Finger Proximal Phalanx ORIF and R Distal Radius ORIF by Dr. Meza on 11/10/23. He reports that he is having continued pain post op. He states that he has run out of all pain medications and has been unable to get any more. He has been taking over the counter Tylenol without any relief.  He states he took his post op dressing down two weeks ago and has been cleaning his wrist/hand with Hydrogen Peroxide. He states he would then put his post op dressing plaster splint back on. He denies fever, chills, and sweats since the time of the surgery.     Interval HPI  1/4/24  Patient reports for follow up of right small finger proximal phalanx ORIF and right distal radius ORIF on 11/10/23. He is 8 weeks postop.  He reports he has been attending therapy regularly.  He notes that he has been gaining more motion within his small finger and wrist.  He notes he has been having a flexure contracture of his right small finger.  He presents today in a progressive extension splint.  Review of OT documentation states lack of consistency with home exercise program.    Physical exam:    Vitals:    01/04/24 1106   Weight: 91.2 kg (201 lb 1 oz)   Height: 5' 9" (1.753 m)   PainSc:   4   PainLoc: Finger     Vital signs are stable, patient is afebrile.  Patient is well dressed and well groomed, no acute distress.  Alert and oriented to person, place, and time.  Post op dressing taken down.  Incision is clean, dry and intact.  There is no erythema or exudate.  There is no sign of any infection. He is NVI. Sutures removed without difficulty. Small finger has limited ROM, with resting in flexion position.  Stiff motion at the PIP of the small finger.  Good motion of the DIP.    Assessment:  s/p R Small Finger Proximal Phalanx ORIF and R Distal Radius ORIF      Lalo was seen today for pain and swelling.    Diagnoses and all orders " for this visit:    Open displaced fracture of proximal phalanx of right little finger, initial encounter  -     ibuprofen (ADVIL,MOTRIN) 800 MG tablet; Take 1 tablet (800 mg total) by mouth every 8 (eight) hours as needed for Pain.      Case was discussed with Dr. Stewart as well as OT Milagro Burdick.  Splint was properly fitted for patient, but patient was not applying properly.  He will continue to follow up with OT for progressive extension of his PIP.  Discussion was had for potential surgery for contracture release of the PIP joint with Dr. Stewart.  Patient would like to continue with therapy to see if he can do this on his own.  He will follow up in 6 weeks with Dr. Stewart.  Patient voiced understanding and is in agreement with plan.

## 2024-01-04 NOTE — PROGRESS NOTES
Occupational Therapy Daily Treatment Note     Date: 1/4/2024  Name: Lalo Tran  [facility] Number: [id number]    Therapy Diagnosis:   Encounter Diagnoses   Name Primary?    Right hand pain Yes    Right wrist pain          Physician: Alex Marino PA-C    Medical Diagnosis:   Open displaced fracture of proximal phalanx of right little finger, initial encounter [S62.616B]  Closed fracture of distal end of right radius, unspecified fracture morphology, initial encounter [S52.501A]  Physician Orders: L Distal Radius Fracture and Proximal Phalanx of Small Finger ORIF  DOS: 11/10/23  12 Visits  Evaluation Date: 12/6/2023  Plan of Care Certification Date: 3-6-24  Authorization Period: 2/29/24 for medicaid  Surgery Date and Procedure: L Distal Radius Fracture and Proximal Phalanx of Small Finger ORIF, DOS: 11/10/23  Date of Return to MD: 1/4/24    Visit # / Visits authorized: 1  / 15  Time In:  9  Time Out: 9:55 am  Total Billable Time: 55 minutes    Precautions:  Standard      Subjective     Pt reports: Wrist and small finger stiff  Response to previous treatment:lokesh well    Pain: Pt did not report score/10  Location: SF PIP    Objective     Lalo participated in dynamic functional therapeutic activities to improve functional performance for 45  minutes, including:    -Patient received fluidotherapy to RUE hand(s) for 10 minutes to increase blood flow, circulation, desensitization, sensory re-education and for pain management.   -performed  Instrument Assisted Soft Tissue Mobilization  stimulating tissue turnover, scar tissue resorption, and the regeneration of tendons, cross friction massage of  PIP joint and throughout musculature  x 8  minutes    -passive range of motion gentle MP flexion, PIP flexion/extension, gentle composite flexion of SF  -manual to wrist with distraction in supination and pronation x 1 minute each   -wrist AROM using dowel in hand x 20 reps each in all ranges  - gripping yellow putty using first  4 digits only  - adjusted orthosis for PIP extension      SF Pre Tx:  MP: 0/30 (+5)  PIP: 50/71 (+10/11)  DIP: 0/50 (+27/23)  NELSON: 41        strength:  (R) 25 pounds       Wrist AROM  WE  WF  UD  RD    50 52 15 10           Home Exercises and Education Provided     Education provided:   - Use of heat  - Progress towards goals     Written Home Exercises Provided: Patient instructed to cont prior HEP.  Exercises were reviewed and Lalo was able to demonstrate them prior to the end of the session.  Lalo demonstrated good  understanding of the HEP provided.   .   See EMR under Patient Instructions for exercises provided prior visit.        Assessment     Pt would continue to benefit from skilled OT to maximize right upper extremity functioning. Pt has gained 16 degrees in total . Pt is wearing orthosis on arrival . Prior OT documenting states inconsistency with hep.  was 14 , now at 25 pounds. Will see PA today with progress of healing on finger to see if we can start tolerate MP flexion, manually. Pt has increased motion in wrist. There is stiffness and pain with all wrist motions with dowel. He is unable to tolerate any weight on his wrist.        Lalo is progressing towards his goals and there are no updates to goals at this time. Pt prognosis is Good.     Pt will continue to benefit from skilled outpatient occupational therapy to address the deficits listed in the problem list on initial evaluation provide pt/family education and to maximize pt's level of independence in the home and community environment.     Anticipated barriers to therapy: Severity of injuries, compliance       Pt's spiritual, cultural and educational needs considered and pt agreeable to plan of care and goals.    Goals:   LTG's (12 weeks):  1)   Increase ROM 30 degrees in right WE/WF to increase functional hand use for work and ADL's. progressing not met 1/4/2024  2)   Increase NELSON 80 degrees in right SF to increase functional  hand use for grasping hand tools. progressing not met 1/4/2024  3)   Increase  strength 50 lbs. to grasp hammer. progressing not met 1/4/2024  4)   Increase lat pinch 5 psis for opening water bottles. progressing not met 1/4/2024  5)   Decrease complaints of pain to  3 out of 10 at worst to increase functional hand use for ADL/work/leisure activities. progressing not met 1/4/2024  6)   Pt will return to near to prior level of function for work activities reporting I or Mod I.  progressing not met 1/4/2024     STG's (6 weeks)  1)   Patient to be IND with HEP and modalities for pain/edema managment. progressing not met 1/4/2024  2)   Increase ROM 15 degrees in right WE/WF to increase functional hand use for work and ADL's. progressing not met 1/4/2024  3)   Increase NELSON 40 degrees in right SF to increase functional hand use for frasping hand tools. progressing not met 1/4/2024   4)   Increase ROM to WNL in right LF-RF to WNL to increase functional hand use for grasping bucket handles. progressing not met 1/4/2024  5)   Increase  strength 25 lbs. to improve functional grasp for ADLs/work/leisure activities. progressing not met 1/4/2024  6)   Increase lat pinch 2 psi's to increase independence with button and FM Coordination. progressing not met 1/4/2024  7)   Patient to be IND with Orthotic use, wear and care precautions. progressing not met 1/4/2024  8)   Decrease complaints of pain to  5 out of 10 at worst to increase functional hand use for ADL/work/leisure activities. progressing not met 1/4/2024      Plan   Cont OT to address above goals.    Julissa Najera, OT

## 2024-01-04 NOTE — Clinical Note
Spoke with Dr. Stewart about this patient.  She is concerned about the PIP joint contracture.  Patient wants to try more therapy to progressively straighten and gain motion of the PIP prior to surgical intervention.  Let me know if there is anything you need to aid.

## 2024-01-05 RX ORDER — IBUPROFEN 800 MG/1
800 TABLET ORAL 3 TIMES DAILY
Qty: 30 TABLET | Refills: 0 | OUTPATIENT
Start: 2024-01-05

## 2024-01-05 RX ORDER — TRAMADOL HYDROCHLORIDE 50 MG/1
50 TABLET ORAL EVERY 6 HOURS
Qty: 10 TABLET | Refills: 0 | OUTPATIENT
Start: 2024-01-05

## 2024-02-14 ENCOUNTER — TELEPHONE (OUTPATIENT)
Dept: ORTHOPEDICS | Facility: CLINIC | Age: 39
End: 2024-02-14
Payer: MEDICAID

## 2024-02-20 ENCOUNTER — OFFICE VISIT (OUTPATIENT)
Dept: ORTHOPEDICS | Facility: CLINIC | Age: 39
End: 2024-02-20
Payer: MEDICAID

## 2024-02-20 VITALS — WEIGHT: 201 LBS | HEIGHT: 69 IN | BODY MASS INDEX: 29.77 KG/M2

## 2024-02-20 DIAGNOSIS — M24.541 CONTRACTURE OF FINGER JOINT, RIGHT: Primary | ICD-10-CM

## 2024-02-20 DIAGNOSIS — Z87.81 S/P ORIF (OPEN REDUCTION INTERNAL FIXATION) FRACTURE: ICD-10-CM

## 2024-02-20 DIAGNOSIS — Z98.890 S/P ORIF (OPEN REDUCTION INTERNAL FIXATION) FRACTURE: ICD-10-CM

## 2024-02-20 DIAGNOSIS — M24.541 CONTRACTURE OF FINGER JOINT, RIGHT: ICD-10-CM

## 2024-02-20 DIAGNOSIS — M79.641 PAIN OF RIGHT HAND: ICD-10-CM

## 2024-02-20 DIAGNOSIS — Z98.890 POST-OPERATIVE STATE: Primary | ICD-10-CM

## 2024-02-20 PROCEDURE — 1159F MED LIST DOCD IN RCRD: CPT | Mod: CPTII,,, | Performed by: ORTHOPAEDIC SURGERY

## 2024-02-20 PROCEDURE — 99213 OFFICE O/P EST LOW 20 MIN: CPT | Mod: PBBFAC | Performed by: ORTHOPAEDIC SURGERY

## 2024-02-20 PROCEDURE — 99999 PR PBB SHADOW E&M-EST. PATIENT-LVL III: CPT | Mod: PBBFAC,,, | Performed by: ORTHOPAEDIC SURGERY

## 2024-02-20 PROCEDURE — 3008F BODY MASS INDEX DOCD: CPT | Mod: CPTII,,, | Performed by: ORTHOPAEDIC SURGERY

## 2024-02-20 PROCEDURE — 99214 OFFICE O/P EST MOD 30 MIN: CPT | Mod: S$PBB,,, | Performed by: ORTHOPAEDIC SURGERY

## 2024-02-20 NOTE — H&P (VIEW-ONLY)
"HPI   11/29/23  Mr. Tran is here today for a post-operative visit.  He is 19 days status post R Small Finger Proximal Phalanx ORIF and R Distal Radius ORIF by Dr. Meza on 11/10/23. He reports that he is having continued pain post op. He states that he has run out of all pain medications and has been unable to get any more. He has been taking over the counter Tylenol without any relief.  He states he took his post op dressing down two weeks ago and has been cleaning his wrist/hand with Hydrogen Peroxide. He states he would then put his post op dressing plaster splint back on. He denies fever, chills, and sweats since the time of the surgery.     Interval HPI  1/4/24  Patient reports for follow up of right small finger proximal phalanx ORIF and right distal radius ORIF on 11/10/23. He is 8 weeks postop.  He reports he has been attending therapy regularly.  He notes that he has been gaining more motion within his small finger and wrist.  He notes he has been having a flexure contracture of his right small finger.  He presents today in a progressive extension splint.  Review of OT documentation states lack of consistency with home exercise program.      Interval HPI 02/20/2024   Mr. Tran is here today for follow-up of right small proximal phalanx ORIF and right distal radius ORIF on 11/10/2023.  He reports he has stopped doing OT but doing home exercises with a squeeze ball.  Of note his last therapy visit was 01/04/2024.  Reports tenderness at incision site along lateral aspect of MCP. Reports inability to extend his right small finger. He denies any pain of right wrist, only mild dorsal swelling which is not bothersome to him.   Physical exam:    Vitals:    02/20/24 1107   Weight: 91.2 kg (201 lb)   Height: 5' 9" (1.753 m)     Vital signs are stable, patient is afebrile.  Patient is well dressed and well groomed, no acute distress.  Alert and oriented to person, place, and time.  Post op dressing taken " down.  Incision is clean, dry and intact.  There is no erythema or exudate.  There is no sign of any infection. He is NVI. Sutures removed without difficulty. Small finger has limited ROM, with resting in flexion position.  Stiff motion at the PIP of the small finger.  Good motion of the DIP.    Assessment:  s/p R Small Finger Proximal Phalanx ORIF and R Distal Radius ORIF    Lalo was seen today for post-op evaluation and post-op evaluation.    Diagnoses and all orders for this visit:    Post-operative state  -     Ambulatory referral/consult to Physical/Occupational Therapy; Future    Contracture of finger joint, right  -     Ambulatory referral/consult to Physical/Occupational Therapy; Future    Based on exam today, I recommended surgical intervention to remove right small finger proximal phalanx hardware which appears to be backing up and also to do a PIP contracture release.  I would like for him to also follow-up postop day 1 with therapy.  We discussed risks and benefits of surgical procedure today in clinic surgical consents were signed.     There are no diagnoses linked to this encounter.    Patient voiced understanding and is in agreement with plan.

## 2024-02-20 NOTE — PROGRESS NOTES
Seen and examined he has a flexion contracture of his PIP joint that is over 90° it is fixed he did some therapy but not consistently he also has some tenderness over his MCP joint his hardware does look like it is backing out that has not necessarily affecting his PIP joint the unsure why PIP joint is contracted but he needs a contracture release and at that time we will remove the hardware risks and benefits were explained to the patient in detail we will have therapy start the very next day

## 2024-02-21 RX ORDER — TRAMADOL HYDROCHLORIDE 50 MG/1
50 TABLET ORAL EVERY 6 HOURS PRN
Qty: 10 TABLET | Refills: 0 | Status: SHIPPED | OUTPATIENT
Start: 2024-02-21 | End: 2024-02-26 | Stop reason: HOSPADM

## 2024-02-22 DIAGNOSIS — T84.84XA PAINFUL ORTHOPAEDIC HARDWARE: ICD-10-CM

## 2024-02-22 RX ORDER — SODIUM CHLORIDE 9 MG/ML
INJECTION, SOLUTION INTRAVENOUS CONTINUOUS
Status: CANCELLED | OUTPATIENT
Start: 2024-02-22

## 2024-02-22 RX ORDER — MUPIROCIN 20 MG/G
OINTMENT TOPICAL
Status: CANCELLED | OUTPATIENT
Start: 2024-02-22

## 2024-02-23 ENCOUNTER — ANESTHESIA EVENT (OUTPATIENT)
Dept: SURGERY | Facility: HOSPITAL | Age: 39
End: 2024-02-23
Payer: MEDICAID

## 2024-02-23 ENCOUNTER — TELEPHONE (OUTPATIENT)
Dept: ORTHOPEDICS | Facility: CLINIC | Age: 39
End: 2024-02-23
Payer: MEDICAID

## 2024-02-23 NOTE — TELEPHONE ENCOUNTER
Spoke c pt. Informed pt of 8:30 a.m. arrival time for surgery at the [facility]. Reminded pt of NPO status & PO appt. Pt expressed understanding & was thankful.

## 2024-02-26 ENCOUNTER — TELEPHONE (OUTPATIENT)
Dept: ORTHOPEDICS | Facility: CLINIC | Age: 39
End: 2024-02-26
Payer: MEDICAID

## 2024-02-26 ENCOUNTER — ANESTHESIA (OUTPATIENT)
Dept: SURGERY | Facility: HOSPITAL | Age: 39
End: 2024-02-26
Payer: MEDICAID

## 2024-02-26 ENCOUNTER — HOSPITAL ENCOUNTER (OUTPATIENT)
Facility: HOSPITAL | Age: 39
Discharge: HOME | End: 2024-02-26
Attending: ORTHOPAEDIC SURGERY | Admitting: ORTHOPAEDIC SURGERY
Payer: MEDICAID

## 2024-02-26 VITALS
SYSTOLIC BLOOD PRESSURE: 151 MMHG | TEMPERATURE: 98 F | OXYGEN SATURATION: 99 % | BODY MASS INDEX: 28.14 KG/M2 | DIASTOLIC BLOOD PRESSURE: 73 MMHG | HEART RATE: 62 BPM | HEIGHT: 69 IN | WEIGHT: 190 LBS | RESPIRATION RATE: 12 BRPM

## 2024-02-26 DIAGNOSIS — T84.84XA PAINFUL ORTHOPAEDIC HARDWARE: Primary | ICD-10-CM

## 2024-02-26 PROBLEM — M24.541 CONTRACTURE OF FINGER JOINT, RIGHT: Status: ACTIVE | Noted: 2024-02-26

## 2024-02-26 PROCEDURE — 37000008 HC ANESTHESIA 1ST 15 MINUTES: Performed by: ORTHOPAEDIC SURGERY

## 2024-02-26 PROCEDURE — 25000003 PHARM REV CODE 250: Performed by: PHYSICIAN ASSISTANT

## 2024-02-26 PROCEDURE — 71000033 HC RECOVERY, INTIAL HOUR: Performed by: ORTHOPAEDIC SURGERY

## 2024-02-26 PROCEDURE — 36000706: Performed by: ORTHOPAEDIC SURGERY

## 2024-02-26 PROCEDURE — 64415 NJX AA&/STRD BRCH PLXS IMG: CPT | Mod: 59,RT | Performed by: STUDENT IN AN ORGANIZED HEALTH CARE EDUCATION/TRAINING PROGRAM

## 2024-02-26 PROCEDURE — 25000003 PHARM REV CODE 250: Performed by: STUDENT IN AN ORGANIZED HEALTH CARE EDUCATION/TRAINING PROGRAM

## 2024-02-26 PROCEDURE — 99900035 HC TECH TIME PER 15 MIN (STAT)

## 2024-02-26 PROCEDURE — D9220A PRA ANESTHESIA: Mod: ANES,,, | Performed by: ANESTHESIOLOGY

## 2024-02-26 PROCEDURE — 25000003 PHARM REV CODE 250: Performed by: NURSE ANESTHETIST, CERTIFIED REGISTERED

## 2024-02-26 PROCEDURE — 94761 N-INVAS EAR/PLS OXIMETRY MLT: CPT

## 2024-02-26 PROCEDURE — 25000003 PHARM REV CODE 250: Performed by: ORTHOPAEDIC SURGERY

## 2024-02-26 PROCEDURE — 63600175 PHARM REV CODE 636 W HCPCS: Performed by: NURSE ANESTHETIST, CERTIFIED REGISTERED

## 2024-02-26 PROCEDURE — D9220A PRA ANESTHESIA: Mod: CRNA,,, | Performed by: NURSE ANESTHETIST, CERTIFIED REGISTERED

## 2024-02-26 PROCEDURE — 36000707: Performed by: ORTHOPAEDIC SURGERY

## 2024-02-26 PROCEDURE — 27201423 OPTIME MED/SURG SUP & DEVICES STERILE SUPPLY: Performed by: ORTHOPAEDIC SURGERY

## 2024-02-26 PROCEDURE — 63600175 PHARM REV CODE 636 W HCPCS: Mod: JZ,JG | Performed by: ANESTHESIOLOGY

## 2024-02-26 PROCEDURE — 37000009 HC ANESTHESIA EA ADD 15 MINS: Performed by: ORTHOPAEDIC SURGERY

## 2024-02-26 PROCEDURE — 71000015 HC POSTOP RECOV 1ST HR: Performed by: ORTHOPAEDIC SURGERY

## 2024-02-26 PROCEDURE — 26525 RELEASE FINGER CONTRACTURE: CPT | Mod: F9,,, | Performed by: ORTHOPAEDIC SURGERY

## 2024-02-26 PROCEDURE — 20670 REMOVAL IMPLANT SUPERFICIAL: CPT | Mod: XS,51,, | Performed by: ORTHOPAEDIC SURGERY

## 2024-02-26 PROCEDURE — 64415 NJX AA&/STRD BRCH PLXS IMG: CPT | Mod: 59,RT,, | Performed by: ANESTHESIOLOGY

## 2024-02-26 PROCEDURE — 63600175 PHARM REV CODE 636 W HCPCS: Performed by: PHYSICIAN ASSISTANT

## 2024-02-26 RX ORDER — BACITRACIN ZINC 500 UNIT/G
OINTMENT (GRAM) TOPICAL
Status: DISCONTINUED | OUTPATIENT
Start: 2024-02-26 | End: 2024-02-26 | Stop reason: HOSPADM

## 2024-02-26 RX ORDER — DEXAMETHASONE SODIUM PHOSPHATE 4 MG/ML
INJECTION, SOLUTION INTRA-ARTICULAR; INTRALESIONAL; INTRAMUSCULAR; INTRAVENOUS; SOFT TISSUE
Status: DISCONTINUED | OUTPATIENT
Start: 2024-02-26 | End: 2024-02-26

## 2024-02-26 RX ORDER — LIDOCAINE HYDROCHLORIDE 10 MG/ML
INJECTION, SOLUTION INTRAVENOUS
Status: DISCONTINUED | OUTPATIENT
Start: 2024-02-26 | End: 2024-02-26

## 2024-02-26 RX ORDER — PROPOFOL 10 MG/ML
VIAL (ML) INTRAVENOUS CONTINUOUS PRN
Status: DISCONTINUED | OUTPATIENT
Start: 2024-02-26 | End: 2024-02-26

## 2024-02-26 RX ORDER — HALOPERIDOL 5 MG/ML
0.5 INJECTION INTRAMUSCULAR EVERY 10 MIN PRN
Status: DISCONTINUED | OUTPATIENT
Start: 2024-02-26 | End: 2024-02-26 | Stop reason: HOSPADM

## 2024-02-26 RX ORDER — OXYCODONE HYDROCHLORIDE 5 MG/1
5 TABLET ORAL
Status: DISCONTINUED | OUTPATIENT
Start: 2024-02-26 | End: 2024-02-26 | Stop reason: HOSPADM

## 2024-02-26 RX ORDER — HYDROMORPHONE HYDROCHLORIDE 1 MG/ML
0.2 INJECTION, SOLUTION INTRAMUSCULAR; INTRAVENOUS; SUBCUTANEOUS EVERY 5 MIN PRN
Status: DISCONTINUED | OUTPATIENT
Start: 2024-02-26 | End: 2024-02-26 | Stop reason: HOSPADM

## 2024-02-26 RX ORDER — HYDROCODONE BITARTRATE AND ACETAMINOPHEN 5; 325 MG/1; MG/1
1 TABLET ORAL EVERY 6 HOURS PRN
Qty: 10 TABLET | Refills: 0 | Status: SHIPPED | OUTPATIENT
Start: 2024-02-26

## 2024-02-26 RX ORDER — FENTANYL CITRATE 50 UG/ML
25 INJECTION, SOLUTION INTRAMUSCULAR; INTRAVENOUS EVERY 5 MIN PRN
Status: DISCONTINUED | OUTPATIENT
Start: 2024-02-26 | End: 2024-02-26 | Stop reason: HOSPADM

## 2024-02-26 RX ORDER — MIDAZOLAM HYDROCHLORIDE 1 MG/ML
INJECTION INTRAMUSCULAR; INTRAVENOUS
Status: DISCONTINUED | OUTPATIENT
Start: 2024-02-26 | End: 2024-02-26

## 2024-02-26 RX ORDER — ACETAMINOPHEN 500 MG
1000 TABLET ORAL
Status: COMPLETED | OUTPATIENT
Start: 2024-02-26 | End: 2024-02-26

## 2024-02-26 RX ORDER — MUPIROCIN 20 MG/G
OINTMENT TOPICAL
Status: DISCONTINUED | OUTPATIENT
Start: 2024-02-26 | End: 2024-02-26 | Stop reason: HOSPADM

## 2024-02-26 RX ORDER — SODIUM CHLORIDE 9 MG/ML
INJECTION, SOLUTION INTRAVENOUS CONTINUOUS
Status: DISCONTINUED | OUTPATIENT
Start: 2024-02-26 | End: 2024-02-26 | Stop reason: HOSPADM

## 2024-02-26 RX ORDER — ONDANSETRON HYDROCHLORIDE 2 MG/ML
INJECTION, SOLUTION INTRAVENOUS
Status: DISCONTINUED | OUTPATIENT
Start: 2024-02-26 | End: 2024-02-26

## 2024-02-26 RX ORDER — DEXMEDETOMIDINE HYDROCHLORIDE 100 UG/ML
INJECTION, SOLUTION INTRAVENOUS
Status: DISCONTINUED | OUTPATIENT
Start: 2024-02-26 | End: 2024-02-26

## 2024-02-26 RX ORDER — FENTANYL CITRATE 50 UG/ML
100 INJECTION, SOLUTION INTRAMUSCULAR; INTRAVENOUS
Status: DISPENSED | OUTPATIENT
Start: 2024-02-26

## 2024-02-26 RX ORDER — MIDAZOLAM HYDROCHLORIDE 1 MG/ML
1 INJECTION INTRAMUSCULAR; INTRAVENOUS
Status: DISPENSED | OUTPATIENT
Start: 2024-02-26

## 2024-02-26 RX ORDER — BUPIVACAINE HYDROCHLORIDE 5 MG/ML
INJECTION, SOLUTION EPIDURAL; INTRACAUDAL
Status: COMPLETED | OUTPATIENT
Start: 2024-02-26 | End: 2024-02-26

## 2024-02-26 RX ORDER — CEFAZOLIN SODIUM 1 G/3ML
INJECTION, POWDER, FOR SOLUTION INTRAMUSCULAR; INTRAVENOUS
Status: DISCONTINUED | OUTPATIENT
Start: 2024-02-26 | End: 2024-02-26

## 2024-02-26 RX ADMIN — FENTANYL CITRATE 100 MCG: 50 INJECTION INTRAMUSCULAR; INTRAVENOUS at 08:02

## 2024-02-26 RX ADMIN — MIDAZOLAM HYDROCHLORIDE 2 MG: 1 INJECTION, SOLUTION INTRAMUSCULAR; INTRAVENOUS at 09:02

## 2024-02-26 RX ADMIN — CEFAZOLIN 2 G: 330 INJECTION, POWDER, FOR SOLUTION INTRAMUSCULAR; INTRAVENOUS at 09:02

## 2024-02-26 RX ADMIN — DEXMEDETOMIDINE 20 MCG: 100 INJECTION, SOLUTION, CONCENTRATE INTRAVENOUS at 09:02

## 2024-02-26 RX ADMIN — SODIUM CHLORIDE: 9 INJECTION, SOLUTION INTRAVENOUS at 07:02

## 2024-02-26 RX ADMIN — SODIUM CHLORIDE: 9 INJECTION, SOLUTION INTRAVENOUS at 09:02

## 2024-02-26 RX ADMIN — MUPIROCIN: 20 OINTMENT TOPICAL at 07:02

## 2024-02-26 RX ADMIN — LIDOCAINE HYDROCHLORIDE 100 MG: 10 INJECTION, SOLUTION INTRAVENOUS at 09:02

## 2024-02-26 RX ADMIN — BUPIVACAINE HYDROCHLORIDE 30 ML: 5 INJECTION, SOLUTION EPIDURAL; INTRACAUDAL; PERINEURAL at 08:02

## 2024-02-26 RX ADMIN — PROPOFOL 125 MCG/KG/MIN: 10 INJECTION, EMULSION INTRAVENOUS at 09:02

## 2024-02-26 RX ADMIN — MIDAZOLAM 2 MG: 1 INJECTION INTRAMUSCULAR; INTRAVENOUS at 08:02

## 2024-02-26 RX ADMIN — DEXAMETHASONE SODIUM PHOSPHATE 4 MG: 4 INJECTION, SOLUTION INTRAMUSCULAR; INTRAVENOUS at 09:02

## 2024-02-26 RX ADMIN — ONDANSETRON 4 MG: 2 INJECTION INTRAMUSCULAR; INTRAVENOUS at 09:02

## 2024-02-26 RX ADMIN — ACETAMINOPHEN 1000 MG: 500 TABLET ORAL at 07:02

## 2024-02-26 NOTE — OR NURSING
Patient requested to take home removed hardware. MD aware that hardware should be sent to pathology but refused. Surgical tech cleaned hardware with peroxide before sending home with patient

## 2024-02-26 NOTE — BRIEF OP NOTE
[place] - Surgery (Hospital)  Brief Operative Note    Surgery Date: 2/26/2024     Surgeon(s) and Role:     * Debbie Meza MD - Primary    Assisting Surgeon: None    Pre-op Diagnosis:  Contracture of finger joint, right [M24.541]  Pain of right hand [M79.641]    Post-op Diagnosis:  Post-Op Diagnosis Codes:     * Contracture of finger joint, right [M24.541]     * Pain of right hand [M79.641]    Procedure(s) (LRB):  RIGHT SMALL FINGER REMOVAL, HARDWARE (Right)  RIGHT SMALL RELEASE, CONTRACTURE, JOINT (Right)    Anesthesia: Regional    Operative Findings: removal of hardware from finger    Estimated Blood Loss: <2cc         Specimens:   Specimen (24h ago, onward)      None              Discharge Note    OUTCOME: Patient tolerated treatment/procedure well without complication and is now ready for discharge.    DISPOSITION: Home or Self Care    FINAL DIAGNOSIS:  Painful orthopaedic hardware    FOLLOWUP: In clinic    DISCHARGE INSTRUCTIONS:    Discharge Procedure Orders   Notify your health care provider if you experience any of the following:  temperature >100.4     Notify your health care provider if you experience any of the following:  persistent nausea and vomiting or diarrhea     Notify your health care provider if you experience any of the following:  severe uncontrolled pain     Activity as tolerated

## 2024-02-26 NOTE — ANESTHESIA PROCEDURE NOTES
Infraclavicular single shot    Patient location during procedure: pre-op   Block not for primary anesthetic.  Reason for block: at surgeon's request and post-op pain management   Post-op Pain Location: RUE pain   Start time: 2/26/2024 8:10 AM  Timeout: 2/26/2024 8:09 AM   End time: 2/26/2024 8:15 AM    Staffing  Authorizing Provider: Nick Jackson MD  Performing Provider: Teddy Thakur MD    Staffing  Performed by: Teddy Thakur MD  Authorized by: Nick Jackson MD    Preanesthetic Checklist  Completed: patient identified, IV checked, site marked, risks and benefits discussed, surgical consent, monitors and equipment checked, pre-op evaluation and timeout performed  Peripheral Block  Patient position: sitting  Prep: ChloraPrep  Patient monitoring: heart rate, cardiac monitor, continuous pulse ox, continuous capnometry and frequent blood pressure checks  Block type: infraclavicular  Laterality: right  Injection technique: single shot  Needle  Needle type: Stimuplex   Needle gauge: 21 G  Needle length: 4 in  Needle localization: ultrasound guidance and anatomical landmarks   -ultrasound image captured on disc.  Assessment  Injection assessment: negative aspiration and negative parasthesia  Paresthesia pain: none  Heart rate change: no  Slow fractionated injection: yes  Pain Tolerance: comfortable throughout block  Medications:    Medications: bupivacaine (pf) (MARCAINE) injection 0.5% - Perineural   30 mL - 2/26/2024 8:15:00 AM    Additional Notes  Time out conducted. Site eric confirmed with team and patient. Allergies reviewed.   Vital signs stable throughout block. RN monitoring vitals throughout.   Needle advanced under continuous ultrasound guidance.  Local injected incrementally after confirming negative aspiration. No signs or symptoms of intravascular or intraneural injection noted.   No persistent paresthesias elicited or expressed. Patient tolerated procedure well.  30 mL of 0.5%  bupivacaine with epinephrine 1:300K, PF dexamethasone 1 mg, and clonidine 50 mcg used for the block.

## 2024-02-26 NOTE — TRANSFER OF CARE
"Anesthesia Transfer of Care Note    Patient: Lalo Tran    Procedure(s) Performed: Procedure(s) (LRB):  RIGHT SMALL FINGER REMOVAL, HARDWARE (Right)  RIGHT SMALL RELEASE, CONTRACTURE, JOINT (Right)    Patient location: [facility]    Anesthesia Type: general and regional    Transport from OR: Transported from OR on room air with adequate spontaneous ventilation. Transported from OR on 6-10 L/min O2 by face mask with adequate spontaneous ventilation    Post pain: adequate analgesia    Post assessment: no apparent anesthetic complications and tolerated procedure well    Post vital signs: stable    Level of consciousness: awake    Nausea/Vomiting: no nausea/vomiting    Complications: none    Transfer of care protocol was followed      Last vitals: Visit Vitals  /71 (BP Location: Left arm, Patient Position: Lying)   Pulse 70   Temp 36.6 °C (97.8 °F) (Oral)   Resp 11   Ht 5' 9" (1.753 m)   Wt 86.2 kg (190 lb)   SpO2 100%   BMI 28.06 kg/m²     "

## 2024-02-26 NOTE — PLAN OF CARE
VSS.  Patient tolerating oral liquids without difficulty.   No apparent s&s of distress noted at this time, no complaints voiced at this time.   Discharge instructions reviewed with patient/family/friend with good verbal feedback received.   Post op medications Bedside, ARIADNE [place].  Patient ready for discharge.

## 2024-02-26 NOTE — OP NOTE
[facility] Surgery ([facility])  Surgery Department  Operative Note    SUMMARY     Date of Procedure: 2/26/2024     Procedure: Procedure(s) (LRB):  RIGHT SMALL FINGER REMOVAL, HARDWARE (Right)  RIGHT SMALL RELEASE, CONTRACTURE, JOINT (Right)     Surgeon(s) and Role:     * Debbie Meza MD - Primary    Assisting Surgeon: Tao PAZ    Pre-Operative Diagnosis: Contracture of finger joint, right [M24.541]  Pain of right hand [M79.641]    Post-Operative Diagnosis: Post-Op Diagnosis Codes:     * Contracture of finger joint, right [M24.541]     * Pain of right hand [M79.641]    Anesthesia: Regional    Technical Procedures Used: surgery    Description of the Findings of the Procedure:  Indication for procedure Mr May is a 38-year-old male who underwent hardware placement of right P1 fracture the P1 fracture healed well but patient had continued PIP flexion contracture and this was not an area where the surgery was performed originally but he did have a significant contracture did also appear that proximally the screws may be backing out slightly and therefore the decision was made not only to do a PIP flexion contracture release but at the same time remove the hardware risks and benefits were explained to the patient in clinic consents were signed in clinic    Procedure in detail the correct site was marked with the patient's participation holding area the patient underwent regional anesthesia was brought to the operating placed in supine position underwent MAC anesthesia well-padded nonsterile tourniquet was placed on the right upper extremity right upper extremities prepped draped normal sterile fashion a time-out was conducted for the correct site procedure to be indicated IV antibiotics were given to the patient preoperatively after the time-out was done incisions were made over the original incisions for the screws under C-arm fluoroscopy the screws were identified they are under significant amount of scar and  did not appear to violate the MCP joint of any substance the head of the MCP actually looked normal the pins were placed in the screw for alignment and the screws were then removed without difficulty the fracture had healed it was placed in range of motion showed it was stable our attention was then turned to the PIP joint incision was made careful dissection down to the PIP joint capsular release was conducted as well as a volar plate release once that was performed we could achieve full extension it did appear that there was slight amount of arthritis it on the ulnar aspect of P1 head but otherwise look like it was in good condition there not appear any other deformity and again full extension was achieved the areas irrigated copious amounts normal saline nylon closed the skin sterile dressing was applied patient was placed in a well-padded splint after the tourniquet was deflated brisk cap refill sued patient was taken the recovery area in stable condition     Postop plans patient keep the dressing clean dry intact will see the patient back at 2 weeks time sutures out the patient will start therapy the following day    Significant Surgical Tasks Conducted by the Assistant(s), if Applicable: retraction    Complications: No    Estimated Blood Loss (EBL): * No values recorded between 2/26/2024  9:48 AM and 2/26/2024 10:37 AM *           Implants: * No implants in log *    Specimens:   Specimen (24h ago, onward)      None                    Condition: Good    Disposition: PACU - hemodynamically stable.    Attestation: I performed the procedure.    Discharge Note    SUMMARY     Admit Date: 2/26/2024    Discharge Date and Time: 2/26/2024 11:27 AM    Hospital Course (synopsis of major diagnoses, care, treatment, and services provided during the course of the hospital stay): surgery     Final Diagnosis: Post-Op Diagnosis Codes:     * Contracture of finger joint, right [M24.541]     * Pain of right hand  [U08.820]    Disposition: Home or Self Care    Follow Up/Patient Instructions:     Medications:  Reconciled Home Medications:      Medication List        START taking these medications      HYDROcodone-acetaminophen 5-325 mg per tablet  Commonly known as: NORCO  Take 1 tablet by mouth every 6 (six) hours as needed for Pain.            STOP taking these medications      acetaminophen 500 MG tablet  Commonly known as: TYLENOL            Discharge Procedure Orders   Notify your health care provider if you experience any of the following:  temperature >100.4     Notify your health care provider if you experience any of the following:  persistent nausea and vomiting or diarrhea     Notify your health care provider if you experience any of the following:  severe uncontrolled pain     Activity as tolerated

## 2024-02-26 NOTE — TELEPHONE ENCOUNTER
Patient is scheduled for therapy tomorrow in [place]----- Message from aMrcia Vega sent at 2/26/2024  2:13 PM CST -----  Patient is calling to set up OT for post op surgery of his finger can you please put in epic  a order/referral. Thanks!Marcia

## 2024-02-26 NOTE — ANESTHESIA PREPROCEDURE EVALUATION
02/26/2024  Lalo Tran is a 38 y.o., male.      Pre-op Assessment    I have reviewed the Patient Summary Reports.     I have reviewed the Nursing Notes. I have reviewed the NPO Status.   I have reviewed the Medications.     Review of Systems  Anesthesia Hx:  No problems with previous Anesthesia                Social:  Non-Smoker       Cardiovascular:  Exercise tolerance: good                                           Pulmonary:  Pulmonary Normal                       Hepatic/GI:  Hepatic/GI Normal                 Neurological:  Neurology Normal                                      Endocrine:  Endocrine Normal            Psych:  Psychiatric Normal                    Physical Exam  General: Cooperative, Well nourished and Alert    Airway:  Mouth Opening: Normal  Neck ROM: Normal ROM        Anesthesia Plan  Type of Anesthesia, risks & benefits discussed:    Anesthesia Type: Regional, Gen Natural Airway, MAC  Intra-op Monitoring Plan: Standard ASA Monitors  Post Op Pain Control Plan: multimodal analgesia and peripheral nerve block  Induction:  IV  Informed Consent: Informed consent signed with the Patient and all parties understand the risks and agree with anesthesia plan.  All questions answered.   ASA Score: 2    Ready For Surgery From Anesthesia Perspective.     .

## 2024-02-26 NOTE — PLAN OF CARE
Pre op complete. Waiting on anesthesia consent. Pt's wife at bedside; she will hold belongings. Pt resting comfortably with all questions addressed at this time and call light in reach.

## 2024-02-26 NOTE — DISCHARGE INSTRUCTIONS
HAND SURGERY - Care of the Hand after Surgery       Post-Op Care  It is important to follow your orthopaedic surgeon's instructions carefully after you return home. You should ask   someone to check on you that evening. The protocols described here are general in nature. Every person and   every surgery is different so the information given here is for guidance only. If you have questions you should   contact us.     Day of Surgery    You were place in a plaster splint today to protect the surgical area during healing. Do not remove the plaster splint until you are seen by Occupational Therapy or Dr. Molina for your first postop visit    The hand and fingers may be numb for quite some time after surgery. This is due to the anesthetic block used at the time of surgery for pain control.    Begin taking liquids and food as soon as you can. You should always eat some solid food, a sandwich or light meal, a little while before taking your pain meds.     Day 3  Things are much the same on the third day after your surgery. Usually you have less pain and feel like doing more.    Showering: It is important to keep the incision absolutely dry while showering or bathing (use two plastic bags over your hand) or a shower bag.   If you feel that the pain medication you were given after surgery is stronger than you really need you can reduce the dose, take it less frequently or switch to ibuprofen or Tylenol. If you received antibiotics they should be taken until the entire prescription is completed.      Day 14  We will see you back after your surgery to review with you what was done in surgery and will talk about rehab and answer any questions you may have.       HAND SURGERY  Driving: You can drive if you are comfortable and have regained full finger movements and if you have sufficient power to control the vehicle.   Return to work: Timing of your return to work is variable according to your occupation and specific surgery. We  should discuss this at your follow up visit if not already discussed prior.  Elevation: Hand elevation is important to prevent swelling and stiffness of the fingers. One minute of leaving your hand dangling negates four hours of keeping it elevated. Please remember not to walk with your hand hanging down or to sit with your hand resting in your lap. It is fine, however, to lower your hand for light use and you should get back to normal light activities as soon as possible as guided by common sense.     Post-operative exercises  [x] Bend your fingers  Relax your hand. Start with your fingers straight and close together. Bend the end and middle joints of your fingers. Keep your wrist and knuckles straight. Moving slowly and smoothly, return your hand to the starting position. Repeat with your other hand. If you can, perform multiple repetitions of this exercise on each hand.    [x] Open your hand wide                       Spread your fingers apart as wide as you can and hold that position. Slowly relax your fingers and bring them together. Return to the open-wide position. Repeat with each hand and gradually add to the number of repetitions.    [x] Make a fist  Start with your fingers straight and spread apart. Make a loose, gentle fist and wrap your thumb around the outside of your fingers. Be careful not to squeeze your fingers together too tightly. Moving slowly and smoothly, return to the starting position. Repeat. Perform this exercise on both hands.    [x] Touch your fingertips  Straighten your fingers and thumb. Bend your thumb across your palm, touching the tip of your thumb to the pad of your hand just below your pinky finger. If you can't make your thumb touch, just stretch as far as you can. Return your thumb to its starting position, as shown in images 1 through 3.  For the next exercise, form the letter O by touching your thumb to each fingertip, as shown in images 4 through 6. Moving slowly and  "smoothly, touch your index finger to your thumb, then straighten your fingers. Touch your middle finger to your thumb and straighten. Follow with your ring and pinky fingers.    [x] Walk your fingers  Rest your hand on a flat surface, such as a tabletop, with your palm facing down and your fingers spread slightly apart. Moving one finger at a time, slowly walk your fingers toward your thumb. Start by lifting and moving your index finger toward your thumb. Follow by lifting and moving your middle finger toward your thumb. Proceed with moving your ring finger and then your pinky finger toward your thumb. Don't move your wrist or thumb while doing this exercise. Repeat with your other hand.      HAND SURGERY - Common Concerns and Frequently Asked Questions    When to Call the Doctor  Pain, burning, or numbness of the fingers or the back of the hand not relieved by elevation of the arm  Pale or cold finger; bluish nail beds  Red line or streak going up the arm  Excessive swelling  Fever over 101.3ºF  Pain unrelieved by pain medication    Tips for one armed living  It helps to have...   In the shower   Plastic bags and rubber bands to cover bandages - the bags that newspapers come in are good to cover the hand and wrist. Otherwise small trash can liners will do. Use two at a time.   Bottle sponge (soft sponge on a long stick) - for the armpit of your "good" hand.   Shower brush   A hair brush in the shower will help you to wash your hair.   Cotton ginette cloth bathrobe - to dry your back.    In the bathroom   Toothpaste, shampoo, etc. in flip-top or pump (not screw top) dispensers.   Consider an electric razor.   Flossers (dental floss on a "Y" shaped handle).    In the kitchen   Dycem mat (rubber jar opener mat) - to help open jars, but also keep things from sliding around while you are working on them.    Double suction cup pads ("little Octopus") - to hold items while you use or wash them.   Electric can opener with a " "lid magnet strong enough to hold the can in the air - for one handed use.   In the bedroom   Back scratcher.   Large sleeve shirts and tops.   Put away clothing which buttons, fastens or snaps in the back or which uses drawstrings.    Sports bra or a camisole instead of a bra.   [employer] nylons can be pulled on one handed - most others can't.   A "wash and wear" haircut.      "

## 2024-02-27 ENCOUNTER — TELEPHONE (OUTPATIENT)
Dept: ORTHOPEDICS | Facility: CLINIC | Age: 39
End: 2024-02-27
Payer: MEDICAID

## 2024-02-27 NOTE — TELEPHONE ENCOUNTER
Pt was scheduled incorrectly as there is no CHT or Hand therapy in [place]. Pt seeking scheduling.     ----- Message from Tabitha Espino sent at 2/27/2024  8:26 AM CST -----  Type: Patient Call Back    Who called:pt     What is the request in detail:pt calling to get a f/u from surgery. Please call pt     Can the clinic reply by [facility]?    Would the patient rather a call back or a response via [facility]? call    Best call back number:[phone] (home)       Additional Information:

## 2024-02-27 NOTE — ANESTHESIA POSTPROCEDURE EVALUATION
Anesthesia Post Evaluation    Patient: Lalo Tran    Procedure(s) Performed: Procedure(s) (LRB):  RIGHT SMALL FINGER REMOVAL, HARDWARE (Right)  RIGHT SMALL RELEASE, CONTRACTURE, JOINT (Right)    Final Anesthesia Type: general      Patient location during evaluation: PACU  Patient participation: Yes- Able to Participate  Level of consciousness: awake and alert  Post-procedure vital signs: reviewed and stable  Pain management: adequate  Airway patency: patent  DULCE mitigation strategies: Multimodal analgesia  PONV status at discharge: No PONV  Anesthetic complications: no      Cardiovascular status: blood pressure returned to baseline  Respiratory status: unassisted  Hydration status: euvolemic  Follow-up not needed.              Vitals Value Taken Time   /76 02/26/24 1116   Temp 36.6 °C (97.8 °F) 02/26/24 1115   Pulse 59 02/26/24 1116   Resp 14 02/26/24 1116   SpO2 99 % 02/26/24 1116   Vitals shown include unvalidated device data.      Event Time   Out of Recovery 11:07:00         Pain/Raymond Score: Pain Rating Prior to Med Admin: 2 (2/26/2024  8:10 AM)  Raymond Score: 10 (2/26/2024 11:01 AM)

## 2024-02-28 ENCOUNTER — CLINICAL SUPPORT (OUTPATIENT)
Dept: REHABILITATION | Facility: HOSPITAL | Age: 39
End: 2024-02-28
Payer: MEDICAID

## 2024-02-28 DIAGNOSIS — R29.898 WEAKNESS OF RIGHT HAND: ICD-10-CM

## 2024-02-28 DIAGNOSIS — M25.641 DECREASED RANGE OF MOTION OF FINGER OF RIGHT HAND: Primary | ICD-10-CM

## 2024-02-28 PROCEDURE — 97165 OT EVAL LOW COMPLEX 30 MIN: CPT

## 2024-02-28 PROCEDURE — L3933 FO W/O JOINTS CF: HCPCS

## 2024-02-28 PROCEDURE — 97760 ORTHOTIC MGMT&TRAING 1ST ENC: CPT

## 2024-02-28 NOTE — PATIENT INSTRUCTIONS
Tendon Glides and Joint Blocking        Start at position A and move through each position slowly attempting to achieve full glide.  A-E is ONE repetition.     Complete 15 reps at 5 times per day.     PIP Flexion (Active Blocked)        Hold large knuckle straight using other hand. Bend middle joint of SMALL finger as far as possible.  Repeat 15 times. Do 5 sessions per day.            DIP Flexion (Active Blocked)        Hold SMALL finger firmly at the middle so that only the tip joint can bend.  Repeat 5 times. Do 15 sessions per day.  Copyright © [employer]. All rights reserved.

## 2024-03-01 ENCOUNTER — CLINICAL SUPPORT (OUTPATIENT)
Dept: REHABILITATION | Facility: HOSPITAL | Age: 39
End: 2024-03-01
Payer: MEDICAID

## 2024-03-01 DIAGNOSIS — R29.898 WEAKNESS OF RIGHT HAND: ICD-10-CM

## 2024-03-01 DIAGNOSIS — M25.641 DECREASED RANGE OF MOTION OF FINGER OF RIGHT HAND: Primary | ICD-10-CM

## 2024-03-01 PROCEDURE — 97530 THERAPEUTIC ACTIVITIES: CPT

## 2024-03-01 NOTE — PROGRESS NOTES
[facility]  Occupational Therapy Treatment Note     Date: 3/1/2024  Name: Lalo Tran  Clinic Number: [id number]    Therapy Diagnosis:   Encounter Diagnoses   Name Primary?    Decreased range of motion of finger of right hand Yes    Weakness of right hand      Physician: Alex Marino PA-C, MD Derrick   Physician Orders: Eval and Treat day 1 post op  Medical Diagnosis: Hardware removal  Surgical Procedure and Date: 2/26, Hardware removal   Evaluation Date: 2/28/2024  Insurance Authorization Period Expiration: 2/19/25  Plan of Care Certification Period: 5/24/24  Visit # / Visits authorized: 2 / 1  FOTO: Eval: 77% (limitation)     Precautions:  Standard     Time In:10am  Time Out: 1045  Total Appointment Time (timed & untimed codes): 45 minutes      Subjective     Patient reports: The splint is difficult to wear. I also feel like I need some more medicine to manage my pain. I am going to talk with the docs about that.   He was compliant with home exercise program given last session.       Pain: 5/10  Location: right hand     Objective     Objective Measures updated at progress report unless specified.    Treatment     Lalo received the treatments listed below:      manual therapy techniques: for 10 minutes, including:  Performed retrograde massage to R digits/hand/wrist to decrease edema, improve joint mobility, decrease pain and improve lymphatic drainage to increase hand function.      therapeutic activities to improve functional performance for 25  minutes, including:  PROM in planes of extension and flexion   Digit extension x 15 reps with therapist assist to end range  Hand ab/adduction x 15 reps  TGE , isoalted glides x 15 reps each to promote improved fisting     hot pack for 10 minutes to R hand.      Patient Education and Home Exercises     Education provided:   - Progress towards goals     Written Home Exercises Provided: Patient instructed to cont prior HEP.  Exercises  were reviewed and Lalo was able to demonstrate them prior to the end of the session.  Lalo demonstrated good  understanding of the home exercise program provided. See electronic medical record under Patient Instructions for exercises provided during therapy sessions.       Assessment     Pt tolerated treatment well but is still limited in ROM. Extension is significantly improved from previous measurements pre op. Pt report compliance with HEP but endorses increased pain.      Lalo is progressing well towards his goals and there are no updates to goals at this time. Pt prognosis is Good.     Patient will continue to benefit from skilled outpatient occupational therapy to address the deficits listed in the problem list on initial evaluation provide patient/family education and to maximize patient's level of independence in the home and community environment.     Patient's spiritual, cultural and educational needs considered and patient agreeable to plan of care and goals.    Anticipated barriers to occupational therapy: None    Goals:  1)   Patient to be IND with HEP and modalities for pain management  2)   Increase ROM 15 degrees in small finger extension plane of motion to increase functional hand use for work tasks.   3)   Improve ROM at least 20 degrees in small finger MP flexion plane of motion in order to assist with grasping.  4)   Assess  and pinch.   5)   Pt will report  2 out of 10 pain with HEP for at least 3 consecutive sessions indicative of improved function participation in ADLs and IADLs.  6)   Patient to score at 65% or more on FOTO to demonstrate improved perception of functional UE use.  7)   Pt will return to near to prior level of function for ADLs and household management reporting I or Mod I with ADLs (dressing, feeding, grooming, toileting).        Plan     Updates/Grading for next session: towel scrunches and reverse blocking     Milagro Burdick, GUILLE   3/1/2024

## 2024-03-07 ENCOUNTER — CLINICAL SUPPORT (OUTPATIENT)
Dept: REHABILITATION | Facility: HOSPITAL | Age: 39
End: 2024-03-07
Payer: MEDICAID

## 2024-03-07 ENCOUNTER — TELEPHONE (OUTPATIENT)
Dept: ORTHOPEDICS | Facility: CLINIC | Age: 39
End: 2024-03-07
Payer: MEDICAID

## 2024-03-07 DIAGNOSIS — M25.531 RIGHT WRIST PAIN: ICD-10-CM

## 2024-03-07 DIAGNOSIS — M79.641 RIGHT HAND PAIN: Primary | ICD-10-CM

## 2024-03-07 PROBLEM — R29.898 WEAKNESS OF RIGHT HAND: Status: ACTIVE | Noted: 2024-03-07

## 2024-03-07 PROBLEM — M25.641 DECREASED RANGE OF MOTION OF FINGER OF RIGHT HAND: Status: ACTIVE | Noted: 2024-03-07

## 2024-03-07 PROCEDURE — 97530 THERAPEUTIC ACTIVITIES: CPT | Performed by: OCCUPATIONAL THERAPIST

## 2024-03-07 NOTE — PROGRESS NOTES
[facility]  Occupational Therapy Treatment Note     Date: 3/7/2024  Name: Lalo Tran  Clinic Number: [id number]    Therapy Diagnosis:   Encounter Diagnoses   Name Primary?    Right hand pain Yes    Right wrist pain      Physician: Alex Marino PA-C, MD Derrick   Physician Orders: Eval and Treat day 1 post op  Medical Diagnosis: Hardware removal  Surgical Procedure and Date: 2/26, Hardware removal   Evaluation Date: 2/28/2024  Insurance Authorization Period Expiration: 2/19/25  Plan of Care Certification Period: 5/24/24  Visit # / Visits authorized: 3 / 1  FOTO: Eval: 77% (limitation)     Precautions:  Standard     Time In: 10:35 am  Time Out: 11:20 am  Total Appointment Time (timed & untimed codes): 45 minutes      Subjective     Patient reports: he has not been doing his exercises  He was compliant with home exercise program given last session.       Pain: 5/10  Location: right hand     Objective     Objective Measures updated at progress report unless specified.    Treatment     Lalo received the treatments listed below:      manual therapy techniques: for 10 minutes, including:  Performed retrograde massage to R digits/hand/wrist to decrease edema, improve joint mobility, decrease pain and improve lymphatic drainage to increase hand function.      therapeutic activities to improve functional performance for 35  minutes, including:  PROM in planes of extension and flexion   Digit extension x 15 reps with therapist assist to end range  Hand ab/adduction x 15 reps  TGE , isoalted glides x 30 reps each to promote improved fisting   Yellow putty dowel press with chris tape on SF and RF      hot pack for 10 minutes to R hand.      Patient Education and Home Exercises     Education provided:   - Progress towards goals     Written Home Exercises Provided: Patient instructed to cont prior HEP.  Exercises were reviewed and Lalo was able to demonstrate them prior to the end of the  session.  Lalo demonstrated good  understanding of the home exercise program provided. See electronic medical record under Patient Instructions for exercises provided during therapy sessions.       Assessment     Pt had difficulty tolerating treatment and remains limited in A/PROM. He states he has not been compliant with HEP. Educated pt on diagnosis, importance of adherence to HEP and progression of treatment. Plan to progress as tolerated.     Lalo is progressing well towards his goals and there are no updates to goals at this time. Pt prognosis is Good.     Patient will continue to benefit from skilled outpatient occupational therapy to address the deficits listed in the problem list on initial evaluation provide patient/family education and to maximize patient's level of independence in the home and community environment.     Patient's spiritual, cultural and educational needs considered and patient agreeable to plan of care and goals.    Anticipated barriers to occupational therapy: None    Goals:  1)   Patient to be IND with HEP and modalities for pain management  2)   Increase ROM 15 degrees in small finger extension plane of motion to increase functional hand use for work tasks.   3)   Improve ROM at least 20 degrees in small finger MP flexion plane of motion in order to assist with grasping.  4)   Assess  and pinch.   5)   Pt will report  2 out of 10 pain with HEP for at least 3 consecutive sessions indicative of improved function participation in ADLs and IADLs.  6)   Patient to score at 65% or more on FOTO to demonstrate improved perception of functional UE use.  7)   Pt will return to near to prior level of function for ADLs and household management reporting I or Mod I with ADLs (dressing, feeding, grooming, toileting).        Plan     Updates/Grading for next session: towel scrunches and reverse blocking     Margaret Boasberg, OT   3/7/2024     Partial Purse String (Intermediate) Text: Given the location of the defect and the characteristics of the surrounding skin an intermediate purse string closure was deemed most appropriate.  Undermining was performed circumferentially around the surgical defect.  A purse string suture was then placed and tightened. Wound tension only allowed a partial closure of the circular defect.

## 2024-03-07 NOTE — PLAN OF CARE
[facility]  Occupational Therapy Initial Evaluation    Name: Lalo Tran  Clinic Number: [id number]    Therapy Diagnosis:   Encounter Diagnoses   Name Primary?    Decreased range of motion of finger of right hand Yes    Weakness of right hand      Physician: Alex Marino PA-C    Physician Orders: Eval and Treat day 1 post op  Medical Diagnosis: Hardware removal  Surgical Procedure and Date: 2/26, Hardware removal   Evaluation Date: 2/28/2024  Insurance Authorization Period Expiration: 2/19/25  Plan of Care Certification Period: 5/24/24  Visit # / Visits authorized: 1 / 1  FOTO: Eval: 77% (limitation)    Precautions:  Standard    Time In:10am  Time Out: 1045  Total Appointment Time (timed & untimed codes): 45 minutes    SUBJECTIVE     History of Current Condition/Mechanism of Injury: Lalo reports: I did ok after my surgery but my finger never got straight.   Date of Onset: Original injury was 11/10/23    Involved Side: Right  Dominant Side: Right    Mechanism of Injury: Continued decreased ROM   Surgical Procedure: Hardware removal      Prior Therapy: Skilled hand therapy     Pain:  Functional Pain Scale Rating 0-10:   8/10 on average  6/10 at best  10/10 at worst  Location: Right Hand  Description: Aching and Burning  Aggravating Factors: Extension and Flexing  Easing Factors: nothing and rest    Occupation:  [employer]  Working presently: No due to injury     Functional Limitations/Social History:    Previous functional status includes: Independent with all ADLs.     Patient's Goals for Therapy: Improve finger ROM     Past Medical History/Physical Systems Review:   Lalo Tran  has a past medical history of Herniated lumbar intervertebral disc and History of tear of ACL (anterior cruciate ligament).    Lalo Tran  has a past surgical history that includes Hand surgery; Appendectomy; Open reduction and internal fixation (ORIF) of injury of finger (Right, 11/10/2023); Open  reduction and internal fixation (ORIF) of fracture of distal radius (11/10/2023); Removal of hardware from hand (Right, 2/26/2024); and Release of contracture of joint (Right, 2/26/2024).    Lalo has a current medication list which includes the following prescription(s): hydrocodone-acetaminophen, and the following Facility-Administered Medications: fentanyl and midazolam.    Review of patient's allergies indicates:  No Known Allergies       OBJECTIVE     Hand ROM. Measured in degrees.    Range of Motion:   Right Active   2/28/2024   INDEX                          MP Ext/Flex 0/50                         PIP Ext/Flex 0/90                         DIP Ext/Flex 0/60   MIDDLE                                         MP Ext/Flex 0/60                         PIP Ext/Flex 0/70                         DIP Ext/Flex 0/55   RING                            MP Ext/Flex 0/40                         PIP Ext/Flex 0/60                         DIP Ext/Flex 0/40   SMALL                          MP Ext/Flex 0/25                         PIP Ext/Flex 25/45                         DIP Ext/Flex 10/15         Treatment   Total Treatment time (time-based codes) separate from Evaluation: 25 minutes    Lalo received the treatments listed below:     Orthotic Fitting/Training:  Fabricated a static, finger-based gutter orthosis to R digits to promote extension. Instructed to wear 24/7 with removal for HEP, bathing/hygiene.  Instructed to monitor for pain/pressure, increased edema, or redness and to contact office for adjustments as needed. Patient reported good understanding of orthotic wear and care schedule.  Extensive time spent going through wear schedule and precautions with patient demonstrating correct form and no exhibiting any compensatory mechanisms. Pt was able to demonstrate and teach-back in order to ensure he was performing exercises correctly.      Patient Education and Home Exercises      Education provided:   -role of OT,  goals for OT, scheduling/cancellations, insurance limitations with patient.  -Additional Education provided: Tissue healing timeline    Written Home Exercises Provided: yes.  Exercises were reviewed and Lalo was able to demonstrate them prior to the end of the session.  Lalo demonstrated good  understanding of the education provided.   See EMR under Patient Instructions for exercises provided 2/28/2024.    Pt was advised to perform these exercises free of pain, and to stop performing them if pain occurs.      ASSESSMENT     Lalo Tran is a 38 y.o. male referred to outpatient occupational therapy and presents with a medical diagnosis of s/p hardware removal following finger fracture.    Assessment of Occupational Performance   Performance Deficits    Physical:  Joint Mobility  Muscle Power/Strength  Muscle Endurance  Edema   Strength  Pinch Strength  Fine Motor Coordination  Proprioception Functions  Pain    Cognitive:  No Deficits    Psychosocial:    No Deficits     Following medical record review it is determined that pt will benefit from occupational therapy services in order to maximize pain free and/or functional use of right hand. The following goals were discussed with the patient and patient is in agreement with them as to be addressed in the treatment plan. The patient's rehab potential is Good.     Anticipated barriers to occupational therapy: None    Plan of care discussed with patient: Yes  Patient's spiritual, cultural and educational needs considered and patient is agreeable to the plan of care and goals as stated below:     Medical Necessity is demonstrated by the following  Occupational Profile/History  Co-morbidities and personal factors that may impact the plan of care [x] LOW: Brief chart review  [] MODERATE: Expanded chart review   [] HIGH: Extensive chart review       Examination  Performance deficits relating to physical, cognitive or psychosocial skills that result in activity  limitations and/or participation restrictions  [] LOW: addressing 1-3 Performance deficits  [] MODERATE: 3-5 Performance deficits  [x] HIGH: 5+ Performance deficits (please support below)       Treatment Options [] LOW: Limited options  [] MODERATE: Several options  [x] HIGH: Multiple options      Decision Making/ Complexity Score: low       The following goals were discussed with the patient and patient is in agreement with them as to be addressed in the treatment plan.     Goals:   1)   Patient to be IND with HEP and modalities for pain management  2)   Increase ROM 15 degrees in small finger extension plane of motion to increase functional hand use for work tasks.   3)   Improve ROM at least 20 degrees in small finger MP flexion plane of motion in order to assist with grasping.  4)   Assess  and pinch.   5)   Pt will report  2 out of 10 pain with HEP for at least 3 consecutive sessions indicative of improved function participation in ADLs and IADLs.  6)   Patient to score at 65% or more on FOTO to demonstrate improved perception of functional UE use.  7)   Pt will return to near to prior level of function for ADLs and household management reporting I or Mod I with ADLs (dressing, feeding, grooming, toileting).       PLAN   Plan of Care Certification: 2/28/2024 to 5/24/24.     Outpatient Occupational Therapy 2 times weekly for 12 weeks to include the following interventions: Paraffin, Fluidotherapy, Manual therapy/joint mobilizations, Modalities for pain management, US 3 mhz, Therapeutic exercises/activities., Strengthening, Orthotic Fabrication/Fit/Training, Edema Control, Scar Management, Wound Care, Electrical Modalities, Joint Protection, and Energy Conservation..      Milagro Burdick, OT , CHT           I CERTIFY THE NEED FOR THESE SERVICES FURNISHED UNDER THIS PLAN OF TREATMENT AND WHILE UNDER MY CARE  Physician's comments:      Physician's Signature:  ___________________________________________________

## 2024-03-11 ENCOUNTER — CLINICAL SUPPORT (OUTPATIENT)
Dept: REHABILITATION | Facility: HOSPITAL | Age: 39
End: 2024-03-11
Payer: MEDICAID

## 2024-03-11 ENCOUNTER — OFFICE VISIT (OUTPATIENT)
Dept: ORTHOPEDICS | Facility: CLINIC | Age: 39
End: 2024-03-11
Payer: MEDICAID

## 2024-03-11 VITALS — BODY MASS INDEX: 28.15 KG/M2 | HEIGHT: 69 IN | WEIGHT: 190.06 LBS

## 2024-03-11 DIAGNOSIS — M25.641 DECREASED RANGE OF MOTION OF FINGER OF RIGHT HAND: Primary | ICD-10-CM

## 2024-03-11 DIAGNOSIS — Z98.890 POST-OPERATIVE STATE: Primary | ICD-10-CM

## 2024-03-11 DIAGNOSIS — R29.898 WEAKNESS OF RIGHT HAND: ICD-10-CM

## 2024-03-11 PROCEDURE — 97530 THERAPEUTIC ACTIVITIES: CPT

## 2024-03-11 PROCEDURE — 99212 OFFICE O/P EST SF 10 MIN: CPT | Mod: PBBFAC | Performed by: SPECIALIST/TECHNOLOGIST

## 2024-03-11 PROCEDURE — 1159F MED LIST DOCD IN RCRD: CPT | Mod: CPTII,,, | Performed by: SPECIALIST/TECHNOLOGIST

## 2024-03-11 PROCEDURE — 99024 POSTOP FOLLOW-UP VISIT: CPT | Mod: ,,, | Performed by: SPECIALIST/TECHNOLOGIST

## 2024-03-11 PROCEDURE — 99999 PR PBB SHADOW E&M-EST. PATIENT-LVL II: CPT | Mod: PBBFAC,,, | Performed by: SPECIALIST/TECHNOLOGIST

## 2024-03-11 PROCEDURE — 97140 MANUAL THERAPY 1/> REGIONS: CPT

## 2024-03-11 PROCEDURE — 97018 PARAFFIN BATH THERAPY: CPT

## 2024-03-11 NOTE — PROGRESS NOTES
"Mr. Tran is here today for a post-operative visit.  He is 14 days status post R Small Finger Hardware Removal by Dr. Meza on 2/26/24. He reports that he is moderate pain. States he has been taking Ibuprofen and Tylenol with some relief. States he has been attending therapy regularly and doing home exercise program.  He denies fever, chills, and sweats since the time of the surgery.     Physical exam:    Vitals:    03/11/24 1058   Weight: 86.2 kg (190 lb 0.6 oz)   Height: 5' 9" (1.753 m)   PainSc: 0-No pain     Vital signs are stable, patient is afebrile.  Patient is well dressed and well groomed, no acute distress.  Alert and oriented to person, place, and time.  Post op dressing taken down.  Incision is clean, dry and intact.  There is no erythema or exudate.  There is no sign of any infection. He is NVI. Sutures removed without difficulty.      Assessment:  s/p R Small Finger Hardware Removal Removal      Plan:  Lalo was seen today for post-op evaluation.    Diagnoses and all orders for this visit:    Post-operative state        - PO instruction reviewed and provided to patient  - Educated on on scar massage  - Continue regular therapy and home exercise program.   - F/u in 4 weeks for a motion check.   "

## 2024-03-11 NOTE — PROGRESS NOTES
[facility]  Occupational Therapy Treatment Note     Date: 3/11/2024  Name: Lalo Tran  Clinic Number: [id number]    Therapy Diagnosis:   Encounter Diagnoses   Name Primary?    Decreased range of motion of finger of right hand Yes    Weakness of right hand      Physician: Alex Marino PA-C, MD Derrick   Physician Orders: Eval and Treat day 1 post op  Medical Diagnosis: Hardware removal  Surgical Procedure and Date: 2/26, Hardware removal   Evaluation Date: 2/28/2024  Insurance Authorization Period Expiration: 2/19/25  Plan of Care Certification Period: 5/24/24  Visit # / Visits authorized: 4 / 1  FOTO: Eval: 77% (limitation)     Precautions:  Standard     Time In: 1115am  Time Out: 12pm  Total Appointment Time (timed & untimed codes): 45 minutes      Subjective     Patient reports: he has not been wearing hiss splint full time however states that now the stitches are removed it will be less painful to wear it.  He was compliant with home exercise program given last session.     Pain: 5/10  Location: right hand     Objective     Objective Measures updated at progress report unless specified.    Treatment     Lalo received the treatments listed below:      manual therapy techniques: for 10 minutes, including:  Performed retrograde massage to R digits/hand/wrist to decrease edema, improve joint mobility, decrease pain and improve lymphatic drainage to increase hand function.  Manual PROM for digit extension       therapeutic activities to improve functional performance for 35  minutes, including:  PROM in planes of extension and flexion   Digit extension x 15 reps with therapist assist to end range  Hand ab/adduction x 15 reps  TGE , isoalted glides x 30 reps each to promote improved fisting   Yellow putty dowel press with chris tape on SF and RF  Reverse blocking with foam wedge in webspace to reduce scissoring of digits       Paraffin to R hand x 10 min       Patient Education  and Home Exercises     Education provided:   - Progress towards goals     Written Home Exercises Provided: Patient instructed to cont prior HEP.  Exercises were reviewed and Lalo was able to demonstrate them prior to the end of the session.  Lalo demonstrated good  understanding of the home exercise program provided. See electronic medical record under Patient Instructions for exercises provided during therapy sessions.       Assessment     Pt had difficulty tolerating treatment and remains limited in A/PROM. He states he has not been compliant with HEP. Educated pt on diagnosis, importance of adherence to HEP and progression of treatment. Plan to progress as tolerated. Pt with increased pain with aggressive extension today and with attempted flexion. Encouraged to participate in splint wear and full HEP at home.     Lalo is progressing well towards his goals and there are no updates to goals at this time. Pt prognosis is Good.     Patient will continue to benefit from skilled outpatient occupational therapy to address the deficits listed in the problem list on initial evaluation provide patient/family education and to maximize patient's level of independence in the home and community environment.     Patient's spiritual, cultural and educational needs considered and patient agreeable to plan of care and goals.    Anticipated barriers to occupational therapy: None    Goals:  1)   Patient to be IND with HEP and modalities for pain management  2)   Increase ROM 15 degrees in small finger extension plane of motion to increase functional hand use for work tasks.   3)   Improve ROM at least 20 degrees in small finger MP flexion plane of motion in order to assist with grasping.  4)   Assess  and pinch.   5)   Pt will report  2 out of 10 pain with HEP for at least 3 consecutive sessions indicative of improved function participation in ADLs and IADLs.  6)   Patient to score at 65% or more on FOTO to demonstrate  improved perception of functional UE use.  7)   Pt will return to near to prior level of function for ADLs and household management reporting I or Mod I with ADLs (dressing, feeding, grooming, toileting).        Plan     Updates/Grading for next session: towel scrunches and reverse blocking     Milagro Burdick, OT   3/11/2024

## 2024-03-14 ENCOUNTER — TELEPHONE (OUTPATIENT)
Dept: ORTHOPEDICS | Facility: CLINIC | Age: 39
End: 2024-03-14
Payer: MEDICAID

## 2024-03-14 ENCOUNTER — CLINICAL SUPPORT (OUTPATIENT)
Dept: REHABILITATION | Facility: HOSPITAL | Age: 39
End: 2024-03-14
Payer: MEDICAID

## 2024-03-14 DIAGNOSIS — M25.641 DECREASED RANGE OF MOTION OF FINGER OF RIGHT HAND: Primary | ICD-10-CM

## 2024-03-14 DIAGNOSIS — R29.898 WEAKNESS OF RIGHT HAND: ICD-10-CM

## 2024-03-14 DIAGNOSIS — Z98.890 POST-OPERATIVE STATE: Primary | ICD-10-CM

## 2024-03-14 PROCEDURE — 97530 THERAPEUTIC ACTIVITIES: CPT

## 2024-03-14 RX ORDER — MELOXICAM 7.5 MG/1
7.5 TABLET ORAL DAILY
Qty: 30 TABLET | Refills: 0 | Status: SHIPPED | OUTPATIENT
Start: 2024-03-14

## 2024-03-14 NOTE — TELEPHONE ENCOUNTER
Spoke with patient over the phone to discuss continued pain.  He states that therapy has been causing increase in his pain recently.  He states he has not been taking many of his ibuprofen.  Recommend that we returned to taking Tylenol as well as an NSAID.  We will provide him with Mobic that he can take once a day to aid in inflammation as well as pain.  Also recommended that he use Voltaren gel topically.  He can pick the Voltaren gel up over-the-counter. Recommended to wear the splint for 15-20 minutes.

## 2024-03-14 NOTE — PROGRESS NOTES
[facility] OUTPATIENT THERAPY AND WELLNESS  Occupational Therapy Treatment Note     Date: 3/14/2024  Name: Lalo Tran  [facility] Number: [id number]    Therapy Diagnosis:   Encounter Diagnoses   Name Primary?    Decreased range of motion of finger of right hand Yes    Weakness of right hand        Physician: Alex Marino PA-C, MD Derrick   Physician Orders: Eval and Treat day 1 post op  Medical Diagnosis: Hardware removal  Surgical Procedure and Date: 2/26/24, Hardware removal   Evaluation Date: 2/28/2024  Insurance Authorization Period Expiration: 2/19/25  Plan of Care Certification Period: 5/24/24  MD followup 4/9/24  Visit # / Visits authorized: 6 / 12  FOTO: Eval: 77% (limitation)     Precautions:  Standard     Time In: 1030 am  Time Out: 1115 am  Total Appointment Time (timed & untimed codes): 45 minutes      Subjective     Patient reports: He works as , on leave;  he has not been wearing his splint full time however states that now the stitches are removed it will be less painful to wear it. Patient requesting tramadol, PA messaged   He was compliant with home exercise program given last session.     Pain: 5/10  Location: right hand   Patient had clammy this date with PROM     Objective     Objective Measures updated at progress report unless specified.    Range of Motion:   Right Active    2/28/2024   INDEX                           MP Ext/Flex 0/50                         PIP Ext/Flex 0/90                         DIP Ext/Flex 0/60   NELSON        MIDDLE                                          MP Ext/Flex 0/60                         PIP Ext/Flex 0/70                         DIP Ext/Flex 0/55   NELSON        RING                             MP Ext/Flex 0/40                         PIP Ext/Flex 0/60                         DIP Ext/Flex 0/40   NELSON        SMALL                           MP Ext/Flex 0/25                         PIP Ext/Flex 25/45                         DIP Ext/Flex 10/15   NELSON              Treatment     Lalo received the treatments listed below:      Paraffin to R hand x 10 min     manual therapy techniques: for 10 minutes, including:  Performed retrograde massage to R digits/hand/wrist to decrease edema, improve joint mobility, decrease pain and improve lymphatic drainage to increase hand function.  -debrided eschar on scar   - scar massage with mini vibrator and suction to decrease adhesions and improve tensile glide.   -  joint mobilization at PIP, MP and Manual PROM for digit flexion and extension       therapeutic activities to improve functional performance for 25  minutes, including:  PROM in planes of extension and flexion   Digit extension with Place and hold x 15 reps with therapist assist to end range  Hand ab/adduction x 15 reps  TGE , isoalted glides x 30 reps each to promote improved fisting   - added yellow putty for gross , raking, reverse raking, isolated FDS strengthening and digit adduction x 10 reps each. Provided putty and upgraded HEP.   -chris tape on SF and RF for tendon glides   - Reverse blocking with foam wedge in webspace to reduce scissoring of digits   -encouraged patient to try to wear LMB 15 min 3-5 x daily, or bring in to decrease tension on spring to allow longer wear time.   - patient's hand clammy this date with PROM; messaged PA     MEDICAID TA X 3   Patient Education and Home Exercises     Education provided:   - Progress towards goals     Written Home Exercises Provided: Patient instructed to cont prior HEP.  Exercises were reviewed and Lalo was able to demonstrate them prior to the end of the session.  Lalo demonstrated good  understanding of the home exercise program provided. See electronic medical record under Patient Instructions for exercises provided during therapy sessions.       Assessment     Pt had difficulty tolerating treatment and remains limited in A/PROM. He states he has not been compliant with HEP. Educated pt on diagnosis,  importance of adherence to HEP and progression of treatment. Plan to progress as tolerated. Pt with increased pain with aggressive extension today and with attempted flexion. Encouraged to participate in splint wear and full HEP at home.     Lalo is progressing well towards his goals and there are no updates to goals at this time. Pt prognosis is Good.     Patient will continue to benefit from skilled outpatient occupational therapy to address the deficits listed in the problem list on initial evaluation provide patient/family education and to maximize patient's level of independence in the home and community environment.     Patient's spiritual, cultural and educational needs considered and patient agreeable to plan of care and goals.    Anticipated barriers to occupational therapy: None    Goals:  1)   Patient to be IND with HEP and modalities for pain management  2)   Increase ROM 15 degrees in small finger extension plane of motion to increase functional hand use for work tasks.   3)   Improve ROM at least 20 degrees in small finger MP flexion plane of motion in order to assist with grasping.  4)   Assess  and pinch.   5)   Pt will report  2 out of 10 pain with HEP for at least 3 consecutive sessions indicative of improved function participation in ADLs and IADLs.  6)   Patient to score at 65% or more on FOTO to demonstrate improved perception of functional UE use.  7)   Pt will return to near to prior level of function for ADLs and household management reporting I or Mod I with ADLs (dressing, feeding, grooming, toileting).        Plan     Updates/Grading for next session: towel scrunches and reverse blocking     Barbara Thompson, OT , CHT   3/14/2024

## 2024-03-14 NOTE — PATIENT INSTRUCTIONS
[facility]  OCCUPATIONAL THERAPY  HOME EXERCISE PROGRAM     Complete the following strengthening program 1x/day.     10 reps 1-2 x daily                 _                       Isolated digit flexion   With palm facing up, put putty over small finger and try to bend finger up. 10 x               Barbara CASILLAS CHT  Certified Hand Therapist  Occupational Therapist

## 2024-03-18 ENCOUNTER — CLINICAL SUPPORT (OUTPATIENT)
Dept: REHABILITATION | Facility: HOSPITAL | Age: 39
End: 2024-03-18
Payer: MEDICAID

## 2024-03-18 DIAGNOSIS — M25.641 DECREASED RANGE OF MOTION OF FINGER OF RIGHT HAND: Primary | ICD-10-CM

## 2024-03-18 DIAGNOSIS — R29.898 WEAKNESS OF RIGHT HAND: ICD-10-CM

## 2024-03-18 PROCEDURE — 97530 THERAPEUTIC ACTIVITIES: CPT | Performed by: OCCUPATIONAL THERAPIST

## 2024-03-18 NOTE — PROGRESS NOTES
[facility]  Occupational Therapy Treatment Note     Date: 3/18/2024  Name: Lalo Tran  [facility] Number: [id number]    Therapy Diagnosis:   Encounter Diagnoses   Name Primary?    Decreased range of motion of finger of right hand Yes    Weakness of right hand        Physician: Alex Marino PA-C, MD Derrick   Physician Orders: Eval and Treat day 1 post op  Medical Diagnosis: Hardware removal  Surgical Procedure and Date: 2/26/24, Hardware removal   Evaluation Date: 2/28/2024  Insurance Authorization Period Expiration: 2/19/25  Plan of Care Certification Period: 5/24/24  MD followup 4/9/24  Visit # / Visits authorized: 7 / 12  FOTO: Eval: 77% (limitation)     Precautions:  Standard     Time In: 10:50 am  Time Out: 11:30 am  Total Appointment Time (timed & untimed codes): 40 minutes      Subjective     Patient reports: he has less pain today  He was compliant with home exercise program given last session.     Pain: 1/10  Location: right hand     Objective     Objective Measures updated at progress report unless specified.    Range of Motion:   Right Active    2/28/2024   INDEX                           MP Ext/Flex 0/50                         PIP Ext/Flex 0/90                         DIP Ext/Flex 0/60   NELSON        MIDDLE                                          MP Ext/Flex 0/60                         PIP Ext/Flex 0/70                         DIP Ext/Flex 0/55   NELSON        RING                             MP Ext/Flex 0/40                         PIP Ext/Flex 0/60                         DIP Ext/Flex 0/40   NELSON        SMALL                           MP Ext/Flex 0/25                         PIP Ext/Flex 25/45                         DIP Ext/Flex 10/15   NELSON             Treatment     Lalo received the treatments listed below:      Paraffin to R hand x 10 min     manual therapy techniques: for 10 minutes, including:  Performed retrograde massage to R digits/hand/wrist to decrease edema,  improve joint mobility, decrease pain and improve lymphatic drainage to increase hand function.  - scar massage with mini vibrator and suction to decrease adhesions and improve tensile glide.   -  joint mobilization at PIP, MP and Manual PROM for digit flexion and extension       therapeutic activities to improve functional performance for 25  minutes, including:  PROM in planes of extension and flexion   Digit extension with Place and hold x 15 reps with therapist assist to end range  Hand ab/adduction x 15 reps  TGE , isoalted glides x 30 reps each to promote improved fisting   - red putty dowel press, raking, reverse raking, isolated FDS strengthening and digit adduction x 10 reps each. Provided putty and upgraded HEP.   -chris tape on SF and RF for tendon glides   - Reverse blocking with foam wedge in webspace to reduce scissoring of digits  - Red flexbar smiles, frowns and twists x 20   - Peg  with SF x 1 board   -encouraged patient to try to wear LMB 15 min 3-5 x daily, or bring in to decrease tension on spring to allow longer wear time    MEDICAID TA X 3   Patient Education and Home Exercises     Education provided:   - Progress towards goals     Written Home Exercises Provided: Patient instructed to cont prior HEP.  Exercises were reviewed and Lalo was able to demonstrate them prior to the end of the session.  Lalo demonstrated good  understanding of the home exercise program provided. See electronic medical record under Patient Instructions for exercises provided during therapy sessions.       Assessment     Pt had less difficulty tolerating treatment but remains limited in A/PROM.  Plan to progress as tolerated. Encouraged to participate in splint wear and full HEP at home.     Lalo is progressing well towards his goals and there are no updates to goals at this time. Pt prognosis is Good.     Patient will continue to benefit from skilled outpatient occupational therapy to address the  deficits listed in the problem list on initial evaluation provide patient/family education and to maximize patient's level of independence in the home and community environment.     Patient's spiritual, cultural and educational needs considered and patient agreeable to plan of care and goals.    Anticipated barriers to occupational therapy: None    Goals:  1)   Patient to be IND with HEP and modalities for pain management  2)   Increase ROM 15 degrees in small finger extension plane of motion to increase functional hand use for work tasks.   3)   Improve ROM at least 20 degrees in small finger MP flexion plane of motion in order to assist with grasping.  4)   Assess  and pinch.   5)   Pt will report  2 out of 10 pain with HEP for at least 3 consecutive sessions indicative of improved function participation in ADLs and IADLs.  6)   Patient to score at 65% or more on FOTO to demonstrate improved perception of functional UE use.  7)   Pt will return to near to prior level of function for ADLs and household management reporting I or Mod I with ADLs (dressing, feeding, grooming, toileting).        Plan     Updates/Grading for next session: towel scrunches and reverse blocking     Margaret Boasberg, OT , CHT   3/18/2024

## 2024-03-22 ENCOUNTER — DOCUMENTATION ONLY (OUTPATIENT)
Dept: REHABILITATION | Facility: HOSPITAL | Age: 39
End: 2024-03-22
Payer: MEDICAID

## 2024-03-22 NOTE — PROGRESS NOTES
No Show Note/Documenation    Patient: Lalo Tran  Date of Session: 3/22/2024  MRN: [id number]    Lalo Tran did not attend his/her scheduled therapy appointment today. He did not call to cancel nor reschedule. This is the 1st appointment that he has not attended. No charges have been posted today.         Milagro Burdick, OT

## 2024-03-25 ENCOUNTER — CLINICAL SUPPORT (OUTPATIENT)
Dept: REHABILITATION | Facility: HOSPITAL | Age: 39
End: 2024-03-25
Payer: MEDICAID

## 2024-03-25 DIAGNOSIS — R29.898 WEAKNESS OF RIGHT HAND: ICD-10-CM

## 2024-03-25 DIAGNOSIS — M25.641 DECREASED RANGE OF MOTION OF FINGER OF RIGHT HAND: Primary | ICD-10-CM

## 2024-03-25 PROCEDURE — 97530 THERAPEUTIC ACTIVITIES: CPT

## 2024-03-25 NOTE — PROGRESS NOTES
Passive 35  Active 45      [facility]  Occupational Therapy Treatment Note     Date: 3/25/2024  Name: Lalo Tran  Clinic Number: [id number]    Therapy Diagnosis:   No diagnosis found.      Physician: Alex Marino PA-C, MD Derrick   Physician Orders: Eval and Treat day 1 post op  Medical Diagnosis: Hardware removal  Surgical Procedure and Date: 2/26/24, Hardware removal   Evaluation Date: 2/28/2024  Insurance Authorization Period Expiration: 2/19/25  Plan of Care Certification Period: 5/24/24  MD followup 4/9/24  Visit # / Visits authorized: 7 / 12  FOTO: Eval: 77% (limitation)     Precautions:  Standard     Time In: 10:50 am  Time Out: 11:30 am  Total Appointment Time (timed & untimed codes): 40 minutes      Subjective     Patient reports: he has less pain today  He was compliant with home exercise program given last session.     Pain: 1/10  Location: right hand     Objective     Objective Measures updated at progress report unless specified.    Range of Motion:   Right Active    2/28/2024   INDEX                           MP Ext/Flex 0/50                         PIP Ext/Flex 0/90                         DIP Ext/Flex 0/60   NELSON        MIDDLE                                          MP Ext/Flex 0/60                         PIP Ext/Flex 0/70                         DIP Ext/Flex 0/55   NELSON        RING                             MP Ext/Flex 0/40                         PIP Ext/Flex 0/60                         DIP Ext/Flex 0/40   NELSON        SMALL                           MP Ext/Flex 0/25                         PIP Ext/Flex 25/45                         DIP Ext/Flex 10/15   NELSON             Treatment     Lalo received the treatments listed below:      Paraffin to R hand x 10 min     manual therapy techniques: for 10 minutes, including:  Performed retrograde massage to R digits/hand/wrist to decrease edema, improve joint mobility, decrease pain and improve lymphatic drainage to  increase hand function.  - scar massage with mini vibrator and suction to decrease adhesions and improve tensile glide.   -  joint mobilization at PIP, MP and Manual PROM for digit flexion and extension       therapeutic activities to improve functional performance for 25  minutes, including:  PROM in planes of extension and flexion   Digit extension with Place and hold x 15 reps with therapist assist to end range  Hand ab/adduction x 15 reps  TGE , isoalted glides x 30 reps each to promote improved fisting   - red putty dowel press, raking, reverse raking, isolated FDS strengthening and digit adduction x 10 reps each. Provided putty and upgraded HEP.   -chris tape on SF and RF for tendon glides   - Reverse blocking with foam wedge in webspace to reduce scissoring of digits  - Red flexbar smiles, frowns and twists x 20   - Peg  with SF x 1 board   -encouraged patient to try to wear LMB 15 min 3-5 x daily, or bring in to decrease tension on spring to allow longer wear time    MEDICAID TA X 3   Patient Education and Home Exercises     Education provided:   - Progress towards goals     Written Home Exercises Provided: Patient instructed to cont prior HEP.  Exercises were reviewed and Lalo was able to demonstrate them prior to the end of the session.  Lalo demonstrated good  understanding of the home exercise program provided. See electronic medical record under Patient Instructions for exercises provided during therapy sessions.       Assessment     Pt had less difficulty tolerating treatment but remains limited in A/PROM.  Plan to progress as tolerated. Encouraged to participate in splint wear and full HEP at home. Pt reports occassional compliance with HEP.     May is progressing well towards his goals and there are no updates to goals at this time. Pt prognosis is Good.     Patient will continue to benefit from skilled outpatient occupational therapy to address the deficits listed in the problem  list on initial evaluation provide patient/family education and to maximize patient's level of independence in the home and community environment.     Patient's spiritual, cultural and educational needs considered and patient agreeable to plan of care and goals.    Anticipated barriers to occupational therapy: None    Goals:  1)   Patient to be IND with HEP and modalities for pain management  2)   Increase ROM 15 degrees in small finger extension plane of motion to increase functional hand use for work tasks.   3)   Improve ROM at least 20 degrees in small finger MP flexion plane of motion in order to assist with grasping.  4)   Assess  and pinch.   5)   Pt will report  2 out of 10 pain with HEP for at least 3 consecutive sessions indicative of improved function participation in ADLs and IADLs.  6)   Patient to score at 65% or more on FOTO to demonstrate improved perception of functional UE use.  7)   Pt will return to near to prior level of function for ADLs and household management reporting I or Mod I with ADLs (dressing, feeding, grooming, toileting).        Plan     Updates/Grading for next session: towel scrunches and reverse blocking     Milagro Burdick OT , CHT   3/25/2024

## 2024-03-27 ENCOUNTER — DOCUMENTATION ONLY (OUTPATIENT)
Dept: REHABILITATION | Facility: HOSPITAL | Age: 39
End: 2024-03-27
Payer: MEDICAID

## 2024-03-27 NOTE — PROGRESS NOTES
NO Show or Cancellation Documentation only     Patient:  Lalo Tran  Date of visit: 3/27/24  MRN: [id number]    Lalo Tran did not show up for his/her OT Hand Therapy Appointment as scheduled today.  They did not call or cancel their appointment prior to today.  No charges were posted this date.      VINNY Eduardo, CHT

## 2024-04-02 ENCOUNTER — DOCUMENTATION ONLY (OUTPATIENT)
Dept: REHABILITATION | Facility: HOSPITAL | Age: 39
End: 2024-04-02
Payer: MEDICAID

## 2024-04-02 NOTE — PROGRESS NOTES
NO Show or Cancellation Documentation only     Patient:  Lalo Tran  Date of visit: 4/2/24  MRN: [id number]    Lalo Tran did not show up for his/her OT Hand Therapy Appointment as scheduled today.  They did not call or cancel their appointment prior to today.  No charges were posted this date.      VINNY Eduardo, CHT

## (undated) DEVICE — GLOVE BIOGEL SKINSENSE PI 7.0

## (undated) DEVICE — DRAPE C-ARM MINI DISP

## (undated) DEVICE — GAUZE SPONGE 4X4 12PLY

## (undated) DEVICE — Device

## (undated) DEVICE — UNDERPAD ULTRASORB 300LB 30X36

## (undated) DEVICE — BUCKET PLASTER DISPOSABLE

## (undated) DEVICE — GAUZE CNFRM STRL 3INX4.1YD

## (undated) DEVICE — CORD FOR BIPOLAR FORCEPS 12

## (undated) DEVICE — COVER CAMERA OPERATING ROOM

## (undated) DEVICE — DRESSING N ADH OIL EMUL 3X3

## (undated) DEVICE — BLADE SCALP OPHTL BEVEL STR

## (undated) DEVICE — BIT DRILL QUICK RELEASE 2.0MM

## (undated) DEVICE — GLOVE BIOGEL ECLIPSE SZ 7

## (undated) DEVICE — SOL IRR SOD CHL .9% POUR

## (undated) DEVICE — BANDAGE MATRIX HK LOOP 2IN 5YD

## (undated) DEVICE — SUT 4.0 ETHILON

## (undated) DEVICE — TOURNIQUET SB QC DP 18X4IN

## (undated) DEVICE — FORCEP STRAIGHT DISP

## (undated) DEVICE — DRAPE THREE-QTR REINF 53X77IN

## (undated) DEVICE — DRILL QUICK RELEASE 2.8MM 5IN

## (undated) DEVICE — BIT DRILL QUICK RELEASE 2.8MM

## (undated) DEVICE — DRAPE SURG W/TWL 17 5/8X23

## (undated) DEVICE — GUIDEWIRE ORTHO .054 X 6 IN
Type: IMPLANTABLE DEVICE | Site: FINGER | Status: NON-FUNCTIONAL
Removed: 2023-11-10

## (undated) DEVICE — SUT MONOCRYL 3-0 PS-2 UND

## (undated) DEVICE — SCREW BONE CORTICAL THRD 2.3X2
Type: IMPLANTABLE DEVICE | Site: FINGER | Status: NON-FUNCTIONAL
Removed: 2023-11-10

## (undated) DEVICE — TAPE SURG DURAPORE 2 X10YD

## (undated) DEVICE — SPLINT PLASTER EXT FAST 4X15

## (undated) DEVICE — COVER LIGHT HANDLE 80/CA

## (undated) DEVICE — BLADE SURG #15 CARBON STEEL

## (undated) DEVICE — SOL POVIDONE SCRUB IODINE 4 OZ

## (undated) DEVICE — SOL NACL IRR 1000ML BTL

## (undated) DEVICE — CHLORAPREP 10.5 ML APPLICATOR

## (undated) DEVICE — SLING ARM LARGE FOAM STRAP

## (undated) DEVICE — PAD CAST SPECIALIST STRL 4

## (undated) DEVICE — K-WIRE SNGL TRCR .045 D 6L N/S
Type: IMPLANTABLE DEVICE | Site: FINGER | Status: NON-FUNCTIONAL
Removed: 2023-11-10

## (undated) DEVICE — SEE MEDLINE ITEM 159592

## (undated) DEVICE — SYR B-D DISP CONTROL 10CC100/C

## (undated) DEVICE — BANDAGE MATRIX HK LOOP 4IN 5YD

## (undated) DEVICE — NDL 18GA X1 1/2 REG BEVEL

## (undated) DEVICE — SLING ARM X-LARGE FOAM STRAP